# Patient Record
Sex: FEMALE | Race: OTHER | Employment: PART TIME | URBAN - METROPOLITAN AREA
[De-identification: names, ages, dates, MRNs, and addresses within clinical notes are randomized per-mention and may not be internally consistent; named-entity substitution may affect disease eponyms.]

---

## 2021-09-27 NOTE — LETTER
[facility]  Administration of Anesthesia  IMechelle agree to be cared for by a physician anesthesiologist alone and/or with a nurse anesthetist, who is specially trained to monitor me and give me medicine to put me to sleep or keep me comfortable during my procedure    I understand that my anesthesiologist and/or anesthetist is not an employee or agent of [facility] or [employer]. He or she works for [facility]    As the patient asking for anesthesia services, I agree to:  Allow the anesthesiologist (anesthesia doctor) to give me medicine and do additional procedures as necessary. Some examples are: Starting or using an “IV” to give me medicine, fluids or blood during my procedure, and having a breathing tube placed to help me breathe when I’m asleep (intubation). In the event that my heart stops working properly, I understand that my anesthesiologist will make every effort to sustain my life, unless otherwise directed by [facility] Do Not Resuscitate documents.  Tell my anesthesia doctor before my procedure:  If I am pregnant.  The last time that I ate or drank.  iii. All of the medicines I take (including prescriptions, herbal supplements, and pills I can buy without a prescription (including street drugs/illegal medications). Failure to inform my anesthesiologist about these medicines may increase my risk of anesthetic complications.  iv.If I am allergic to anything or have had a reaction to anesthesia before.  I understand how the anesthesia medicine will help me (benefits).  I understand that with any type of anesthesia medicine there are risks:  The most common risks are: nausea, vomiting, sore throat, muscle soreness, damage to my eyes, mouth, or teeth (from breathing tube placement).  Rare risks include: remembering what happened during my procedure, allergic reactions to medications, injury to my airway, heart, lungs, vision, nerves, or  muscles and in extremely rare instances death.  My doctor has explained to me other choices available to me for my care (alternatives).  Pregnant Patients (“epidural”):  I understand that the risks of having an epidural (medicine given into my back to help control pain during labor), include itching, low blood pressure, difficulty urinating, headache or slowing of the baby’s heart. Very rare risks include infection, bleeding, seizure, irregular heart rhythms and nerve injury.  Regional Anesthesia (“spinal”, “epidural”, & “nerve blocks”):  I understand that rare but potential complications include headache, bleeding, infection, seizure, irregular heart rhythms, and nerve injury.    _____________________________________________________________________________  Patient (or Representative) Signature/Relationship to Patient  Date   Time    _____________________________________________________________________________   Name (if used)    Language/Organization   Time    _____________________________________________________________________________  Nurse Anesthetist Signature     Date   Time  _____________________________________________________________________________  Anesthesiologist Signature     Date   Time  I have discussed the procedure and information above with the patient (or patient’s representative) and answered their questions. The patient or their representative has agreed to have anesthesia services.    _____________________________________________________________________________  Witness        Date   Time  I have verified that the signature is that of the patient or patient’s representative, and that it was signed before the procedure  Patient Name: Mechelle Wise     : 1990                 Printed: 3/23/2025 at 9:02 AM    Medical Record #: [id number]                                            Page 1 of 1  ----------ANESTHESIA CONSENT----------     [[id number]]

## 2022-08-15 ENCOUNTER — HOSPITAL ENCOUNTER (OUTPATIENT)
Age: 32
Discharge: HOME | End: 2022-08-15
Payer: COMMERCIAL

## 2022-08-15 VITALS
DIASTOLIC BLOOD PRESSURE: 78 MMHG | OXYGEN SATURATION: 99 % | RESPIRATION RATE: 18 BRPM | SYSTOLIC BLOOD PRESSURE: 107 MMHG | HEART RATE: 68 BPM | TEMPERATURE: 99 F

## 2022-08-15 DIAGNOSIS — K04.7 DENTAL INFECTION: Primary | ICD-10-CM

## 2022-08-15 PROCEDURE — 99203 OFFICE O/P NEW LOW 30 MIN: CPT | Performed by: NURSE PRACTITIONER

## 2022-08-15 RX ORDER — AMOXICILLIN AND CLAVULANATE POTASSIUM 875; 125 MG/1; MG/1
1 TABLET, FILM COATED ORAL 2 TIMES DAILY
Qty: 20 TABLET | Refills: 0 | Status: SHIPPED | OUTPATIENT
Start: 2022-08-15 | End: 2022-08-25

## 2022-08-15 RX ORDER — ACETAMINOPHEN AND CODEINE PHOSPHATE 300; 30 MG/1; MG/1
1-2 TABLET ORAL EVERY 6 HOURS PRN
Qty: 10 TABLET | Refills: 0 | Status: SHIPPED | OUTPATIENT
Start: 2022-08-15 | End: 2022-08-20

## 2022-08-15 RX ORDER — IBUPROFEN 600 MG/1
600 TABLET ORAL EVERY 8 HOURS PRN
Qty: 30 TABLET | Refills: 0 | Status: SHIPPED | OUTPATIENT
Start: 2022-08-15 | End: 2022-08-22

## 2022-08-15 NOTE — TELEPHONE ENCOUNTER
With Language Line  Guillermina Chiu  ID# [id number]        Patient  and her Father in law  calling ( identified name and  )states patient has never been seen in the clinic     States has facial swelling due to a tooth issue   Informed needs to call a dentist or go to [facility] 7 feels JAMARCUS not understanding, explained again needs to see a dentist or go to UC     Provided locations/ hours for UC in [place]    Informed mask is required for building entry      explained the information for a 3rd time   Father in law said he understands and thank you

## 2022-08-22 ENCOUNTER — HOSPITAL ENCOUNTER (EMERGENCY)
Facility: HOSPITAL | Age: 32
Discharge: LEFT BEFORE DISCHARGE | End: 2022-08-22
Payer: COMMERCIAL

## 2022-08-22 ENCOUNTER — HOSPITAL ENCOUNTER (OUTPATIENT)
Age: 32
Discharge: ANOTHER HOSPITAL | End: 2022-08-22
Payer: COMMERCIAL

## 2022-08-22 VITALS
RESPIRATION RATE: 18 BRPM | HEIGHT: 60 IN | BODY MASS INDEX: 27.48 KG/M2 | WEIGHT: 140 LBS | TEMPERATURE: 97 F | SYSTOLIC BLOOD PRESSURE: 113 MMHG | HEART RATE: 68 BPM | OXYGEN SATURATION: 100 % | DIASTOLIC BLOOD PRESSURE: 69 MMHG

## 2022-08-22 VITALS
OXYGEN SATURATION: 98 % | DIASTOLIC BLOOD PRESSURE: 66 MMHG | WEIGHT: 140 LBS | RESPIRATION RATE: 22 BRPM | TEMPERATURE: 97 F | BODY MASS INDEX: 27 KG/M2 | HEART RATE: 68 BPM | SYSTOLIC BLOOD PRESSURE: 104 MMHG

## 2022-08-22 DIAGNOSIS — R10.9 ABDOMINAL PAIN, ACUTE: Primary | ICD-10-CM

## 2022-08-22 DIAGNOSIS — R19.7 DIARRHEA, UNSPECIFIED TYPE: ICD-10-CM

## 2022-08-22 PROCEDURE — 99204 OFFICE O/P NEW MOD 45 MIN: CPT | Performed by: PHYSICIAN ASSISTANT

## 2022-08-23 NOTE — ED INITIAL ASSESSMENT (HPI)
PT TO ER WITH ABDOMINAL PAIN X4 DAYS AND STATES YESTERDAY SHE NOTICED BLOOD IN HER STOOL, PT STATES THE BLOOD WAS BRIGHT RED, PT DID NOT NOTICE ANY BLOOD IN HER STOOL TODAY. PT DENIES NAUSEA/VOMITING. PT STATES THE PAIN IS GENERALIZED AND IS NOT WORSE IN ANY SPECIFIC PLACE. PT STATES SHE LAST ATE THIS AM AND STATES AFTER SHE ATE THE PAIN GOT WORSE. PT WAS AT IMMEDIATE CARE PTA AND WAS SENT FOR FURTHER EVALUATION. PT STATES SHE WAS PUT ON AMOXICILLIN AND TYLENOL WITH CODEINE FOR A TOOTH INFECTION 4 DAYS AGO.

## 2023-02-22 ENCOUNTER — HOSPITAL ENCOUNTER (OUTPATIENT)
Age: 33
Discharge: ANOTHER HOSPITAL | End: 2023-02-22
Payer: COMMERCIAL

## 2023-02-22 VITALS
DIASTOLIC BLOOD PRESSURE: 71 MMHG | HEIGHT: 59 IN | OXYGEN SATURATION: 100 % | RESPIRATION RATE: 18 BRPM | SYSTOLIC BLOOD PRESSURE: 112 MMHG | BODY MASS INDEX: 28.22 KG/M2 | WEIGHT: 140 LBS | TEMPERATURE: 98 F | HEART RATE: 108 BPM

## 2023-02-22 DIAGNOSIS — R10.9 ABDOMINAL PAIN OF UNKNOWN ETIOLOGY: ICD-10-CM

## 2023-02-22 DIAGNOSIS — M54.50 ACUTE RIGHT-SIDED LOW BACK PAIN WITHOUT SCIATICA: Primary | ICD-10-CM

## 2023-02-22 LAB
B-HCG UR QL: NEGATIVE
BILIRUB UR QL STRIP: NEGATIVE
CLARITY UR: CLEAR
COLOR UR: YELLOW
GLUCOSE UR STRIP-MCNC: NEGATIVE MG/DL
KETONES UR STRIP-MCNC: NEGATIVE MG/DL
NITRITE UR QL STRIP: NEGATIVE
PH UR STRIP: 6.5 [PH]
PROT UR STRIP-MCNC: NEGATIVE MG/DL
SP GR UR STRIP: 1.01
UROBILINOGEN UR STRIP-ACNC: <2 MG/DL

## 2023-02-22 PROCEDURE — 81002 URINALYSIS NONAUTO W/O SCOPE: CPT | Performed by: NURSE PRACTITIONER

## 2023-02-22 PROCEDURE — 99214 OFFICE O/P EST MOD 30 MIN: CPT | Performed by: NURSE PRACTITIONER

## 2023-02-22 PROCEDURE — 81025 URINE PREGNANCY TEST: CPT | Performed by: NURSE PRACTITIONER

## 2023-02-22 NOTE — ED INITIAL ASSESSMENT (HPI)
Pt reports back pain beginning yesterday that extends to abdomen. No injury or trauma.  No urinary symptoms or n/v/d.

## 2023-03-07 ENCOUNTER — OFFICE VISIT (OUTPATIENT)
Dept: FAMILY MEDICINE CLINIC | Facility: CLINIC | Age: 33
End: 2023-03-07

## 2023-03-07 VITALS
SYSTOLIC BLOOD PRESSURE: 99 MMHG | WEIGHT: 143 LBS | BODY MASS INDEX: 28.83 KG/M2 | HEART RATE: 82 BPM | HEIGHT: 59 IN | DIASTOLIC BLOOD PRESSURE: 66 MMHG

## 2023-03-07 DIAGNOSIS — M54.6 ACUTE RIGHT-SIDED THORACIC BACK PAIN: Primary | ICD-10-CM

## 2023-03-07 DIAGNOSIS — Z87.442 HISTORY OF RENAL STONE: ICD-10-CM

## 2023-03-07 DIAGNOSIS — N12 PYELONEPHRITIS: ICD-10-CM

## 2023-03-07 LAB
APPEARANCE: CLEAR
BILIRUBIN: NEGATIVE
GLUCOSE (URINE DIPSTICK): NEGATIVE MG/DL
KETONES (URINE DIPSTICK): NEGATIVE MG/DL
LEUKOCYTES: NEGATIVE
MULTISTIX LOT#: NORMAL NUMERIC
NITRITE, URINE: NEGATIVE
OCCULT BLOOD: NEGATIVE
PH, URINE: 7.5 (ref 4.5–8)
PROTEIN (URINE DIPSTICK): NEGATIVE MG/DL
SPECIFIC GRAVITY: 1.01 (ref 1–1.03)
UROBILINOGEN,SEMI-QN: 0.2 MG/DL (ref 0–1.9)

## 2023-03-07 PROCEDURE — 3078F DIAST BP <80 MM HG: CPT | Performed by: FAMILY MEDICINE

## 2023-03-07 PROCEDURE — 81003 URINALYSIS AUTO W/O SCOPE: CPT | Performed by: FAMILY MEDICINE

## 2023-03-07 PROCEDURE — 99203 OFFICE O/P NEW LOW 30 MIN: CPT | Performed by: FAMILY MEDICINE

## 2023-03-07 PROCEDURE — 3008F BODY MASS INDEX DOCD: CPT | Performed by: FAMILY MEDICINE

## 2023-03-07 PROCEDURE — 3074F SYST BP LT 130 MM HG: CPT | Performed by: FAMILY MEDICINE

## 2023-03-07 RX ORDER — IBUPROFEN 600 MG/1
600 TABLET ORAL EVERY 6 HOURS PRN
Qty: 60 TABLET | Refills: 0 | Status: SHIPPED | OUTPATIENT
Start: 2023-03-07

## 2023-04-06 ENCOUNTER — HOSPITAL ENCOUNTER (OUTPATIENT)
Dept: ULTRASOUND IMAGING | Age: 33
Discharge: HOME | End: 2023-04-06
Attending: FAMILY MEDICINE
Payer: COMMERCIAL

## 2023-04-06 DIAGNOSIS — N12 PYELONEPHRITIS: ICD-10-CM

## 2023-04-06 DIAGNOSIS — Z87.442 HISTORY OF RENAL STONE: ICD-10-CM

## 2023-04-06 PROCEDURE — 76770 US EXAM ABDO BACK WALL COMP: CPT | Performed by: FAMILY MEDICINE

## 2023-04-11 ENCOUNTER — OFFICE VISIT (OUTPATIENT)
Dept: FAMILY MEDICINE CLINIC | Facility: CLINIC | Age: 33
End: 2023-04-11

## 2023-04-11 VITALS
DIASTOLIC BLOOD PRESSURE: 68 MMHG | WEIGHT: 143.38 LBS | HEIGHT: 59 IN | SYSTOLIC BLOOD PRESSURE: 105 MMHG | BODY MASS INDEX: 28.91 KG/M2 | HEART RATE: 77 BPM

## 2023-04-11 DIAGNOSIS — M54.6 ACUTE RIGHT-SIDED THORACIC BACK PAIN: Primary | ICD-10-CM

## 2023-04-11 DIAGNOSIS — N12 PYELONEPHRITIS: ICD-10-CM

## 2023-04-11 PROCEDURE — 3074F SYST BP LT 130 MM HG: CPT | Performed by: FAMILY MEDICINE

## 2023-04-11 PROCEDURE — 3078F DIAST BP <80 MM HG: CPT | Performed by: FAMILY MEDICINE

## 2023-04-11 PROCEDURE — 3008F BODY MASS INDEX DOCD: CPT | Performed by: FAMILY MEDICINE

## 2023-04-11 PROCEDURE — 99213 OFFICE O/P EST LOW 20 MIN: CPT | Performed by: FAMILY MEDICINE

## 2023-08-08 ENCOUNTER — OFFICE VISIT (OUTPATIENT)
Dept: FAMILY MEDICINE CLINIC | Facility: CLINIC | Age: 33
End: 2023-08-08

## 2023-08-08 VITALS
BODY MASS INDEX: 28.83 KG/M2 | SYSTOLIC BLOOD PRESSURE: 102 MMHG | RESPIRATION RATE: 16 BRPM | TEMPERATURE: 97 F | DIASTOLIC BLOOD PRESSURE: 65 MMHG | WEIGHT: 143 LBS | HEART RATE: 72 BPM | HEIGHT: 59 IN

## 2023-08-08 DIAGNOSIS — Z12.4 SCREENING FOR CERVICAL CANCER: ICD-10-CM

## 2023-08-08 DIAGNOSIS — B35.3 TINEA PEDIS OF BOTH FEET: ICD-10-CM

## 2023-08-08 DIAGNOSIS — N89.8 VAGINAL ITCHING: ICD-10-CM

## 2023-08-08 DIAGNOSIS — Z00.00 WELL ADULT EXAM: Primary | ICD-10-CM

## 2023-08-08 PROCEDURE — 3008F BODY MASS INDEX DOCD: CPT | Performed by: NURSE PRACTITIONER

## 2023-08-08 PROCEDURE — 3074F SYST BP LT 130 MM HG: CPT | Performed by: NURSE PRACTITIONER

## 2023-08-08 PROCEDURE — 3078F DIAST BP <80 MM HG: CPT | Performed by: NURSE PRACTITIONER

## 2023-08-08 PROCEDURE — 99395 PREV VISIT EST AGE 18-39: CPT | Performed by: NURSE PRACTITIONER

## 2023-08-08 RX ORDER — CLOTRIMAZOLE AND BETAMETHASONE DIPROPIONATE 10; .64 MG/G; MG/G
1 CREAM TOPICAL 2 TIMES DAILY
Qty: 60 G | Refills: 1 | Status: SHIPPED | OUTPATIENT
Start: 2023-08-08

## 2023-08-11 LAB — TRICHOMONAS SCREEN: NEGATIVE

## 2023-08-12 ENCOUNTER — LAB ENCOUNTER (OUTPATIENT)
Dept: LAB | Age: 33
End: 2023-08-12
Attending: NURSE PRACTITIONER
Payer: COMMERCIAL

## 2023-08-12 DIAGNOSIS — Z00.00 WELL ADULT EXAM: ICD-10-CM

## 2023-08-12 LAB
ALBUMIN SERPL-MCNC: 3.8 G/DL (ref 3.4–5)
ALBUMIN/GLOB SERPL: 1 {RATIO} (ref 1–2)
ALP LIVER SERPL-CCNC: 86 U/L
ALT SERPL-CCNC: 52 U/L
ANION GAP SERPL CALC-SCNC: 3 MMOL/L (ref 0–18)
AST SERPL-CCNC: 42 U/L (ref 15–37)
BASOPHILS # BLD AUTO: 0.12 X10(3) UL (ref 0–0.2)
BASOPHILS NFR BLD AUTO: 1.4 %
BILIRUB SERPL-MCNC: 1.1 MG/DL (ref 0.1–2)
BUN BLD-MCNC: 8 MG/DL (ref 7–18)
CALCIUM BLD-MCNC: 9 MG/DL (ref 8.5–10.1)
CHLORIDE SERPL-SCNC: 109 MMOL/L (ref 98–112)
CHOLEST SERPL-MCNC: 169 MG/DL (ref ?–200)
CO2 SERPL-SCNC: 26 MMOL/L (ref 21–32)
CREAT BLD-MCNC: 0.74 MG/DL
EGFRCR SERPLBLD CKD-EPI 2021: 109 ML/MIN/1.73M2 (ref 60–?)
EOSINOPHIL # BLD AUTO: 0.31 X10(3) UL (ref 0–0.7)
EOSINOPHIL NFR BLD AUTO: 3.6 %
ERYTHROCYTE [DISTWIDTH] IN BLOOD BY AUTOMATED COUNT: 12.8 %
FASTING PATIENT LIPID ANSWER: YES
FASTING STATUS PATIENT QL REPORTED: YES
GLOBULIN PLAS-MCNC: 3.8 G/DL (ref 2.8–4.4)
GLUCOSE BLD-MCNC: 91 MG/DL (ref 70–99)
HCT VFR BLD AUTO: 41 %
HDLC SERPL-MCNC: 38 MG/DL (ref 40–59)
HGB BLD-MCNC: 13.7 G/DL
IMM GRANULOCYTES # BLD AUTO: 0.04 X10(3) UL (ref 0–1)
IMM GRANULOCYTES NFR BLD: 0.5 %
LDLC SERPL CALC-MCNC: 91 MG/DL (ref ?–100)
LYMPHOCYTES # BLD AUTO: 2.17 X10(3) UL (ref 1–4)
LYMPHOCYTES NFR BLD AUTO: 25.1 %
MCH RBC QN AUTO: 30.3 PG (ref 26–34)
MCHC RBC AUTO-ENTMCNC: 33.4 G/DL (ref 31–37)
MCV RBC AUTO: 90.7 FL
MONOCYTES # BLD AUTO: 0.6 X10(3) UL (ref 0.1–1)
MONOCYTES NFR BLD AUTO: 6.9 %
NEUTROPHILS # BLD AUTO: 5.42 X10 (3) UL (ref 1.5–7.7)
NEUTROPHILS # BLD AUTO: 5.42 X10(3) UL (ref 1.5–7.7)
NEUTROPHILS NFR BLD AUTO: 62.5 %
NONHDLC SERPL-MCNC: 131 MG/DL (ref ?–130)
OSMOLALITY SERPL CALC.SUM OF ELEC: 284 MOSM/KG (ref 275–295)
PLATELET # BLD AUTO: 327 10(3)UL (ref 150–450)
POTASSIUM SERPL-SCNC: 4.3 MMOL/L (ref 3.5–5.1)
PROT SERPL-MCNC: 7.6 G/DL (ref 6.4–8.2)
RBC # BLD AUTO: 4.52 X10(6)UL
SODIUM SERPL-SCNC: 138 MMOL/L (ref 136–145)
TRIGL SERPL-MCNC: 236 MG/DL (ref 30–149)
TSI SER-ACNC: 0.93 MIU/ML (ref 0.36–3.74)
VLDLC SERPL CALC-MCNC: 39 MG/DL (ref 0–30)
WBC # BLD AUTO: 8.7 X10(3) UL (ref 4–11)

## 2023-08-12 PROCEDURE — 36415 COLL VENOUS BLD VENIPUNCTURE: CPT

## 2023-08-12 PROCEDURE — 85025 COMPLETE CBC W/AUTO DIFF WBC: CPT

## 2023-08-12 PROCEDURE — 80053 COMPREHEN METABOLIC PANEL: CPT

## 2023-08-12 PROCEDURE — 80061 LIPID PANEL: CPT

## 2023-08-12 PROCEDURE — 84443 ASSAY THYROID STIM HORMONE: CPT

## 2023-08-14 ENCOUNTER — TELEPHONE (OUTPATIENT)
Dept: FAMILY MEDICINE CLINIC | Facility: CLINIC | Age: 33
End: 2023-08-14

## 2023-08-14 DIAGNOSIS — Z23 ENCOUNTER FOR ADMINISTRATION OF VACCINE: Primary | ICD-10-CM

## 2023-08-14 NOTE — TELEPHONE ENCOUNTER
Arturo Lenz: can you place order for Tdap. Patient states she didn't get it at the office 8/8/23. She is scheduled for RN visit 9/9/23 for the vaccine.

## 2023-09-09 ENCOUNTER — NURSE ONLY (OUTPATIENT)
Dept: FAMILY MEDICINE CLINIC | Facility: CLINIC | Age: 33
End: 2023-09-09

## 2023-09-09 DIAGNOSIS — Z23 NEED FOR VACCINATION: Primary | ICD-10-CM

## 2023-09-09 PROCEDURE — 90715 TDAP VACCINE 7 YRS/> IM: CPT | Performed by: NURSE PRACTITIONER

## 2023-09-09 PROCEDURE — 90471 IMMUNIZATION ADMIN: CPT | Performed by: NURSE PRACTITIONER

## 2023-11-06 ENCOUNTER — HOSPITAL ENCOUNTER (OUTPATIENT)
Age: 33
Discharge: HOME | End: 2023-11-06
Payer: COMMERCIAL

## 2023-11-06 ENCOUNTER — APPOINTMENT (OUTPATIENT)
Dept: GENERAL RADIOLOGY | Age: 33
End: 2023-11-06
Attending: NURSE PRACTITIONER
Payer: COMMERCIAL

## 2023-11-06 VITALS
HEART RATE: 76 BPM | SYSTOLIC BLOOD PRESSURE: 105 MMHG | TEMPERATURE: 97 F | RESPIRATION RATE: 16 BRPM | DIASTOLIC BLOOD PRESSURE: 63 MMHG | OXYGEN SATURATION: 100 %

## 2023-11-06 DIAGNOSIS — S62.652A CLOSED NONDISPLACED FRACTURE OF MIDDLE PHALANX OF RIGHT MIDDLE FINGER, INITIAL ENCOUNTER: Primary | ICD-10-CM

## 2023-11-06 PROCEDURE — 99213 OFFICE O/P EST LOW 20 MIN: CPT | Performed by: NURSE PRACTITIONER

## 2023-11-06 PROCEDURE — 73130 X-RAY EXAM OF HAND: CPT | Performed by: NURSE PRACTITIONER

## 2023-11-06 PROCEDURE — A4570 SPLINT: HCPCS | Performed by: NURSE PRACTITIONER

## 2023-11-06 NOTE — ED INITIAL ASSESSMENT (HPI)
Pt c/o R 3rd digit pain since October 11th. States works at [employer] and injured finger in a [employer]/ [address].  + pain + swelling CMS intact

## 2023-11-07 NOTE — DISCHARGE INSTRUCTIONS
Wear the finger splint until otherwise directed by the specialist.  Rest from exacerbating activities for the next week. Apply ice/cold compress for 20min at a time 4-6x daily for the next 2-3 days. Keep the right hand elevated when resting as much as possible. You may take Motrin every 6 hours as needed for pain. Take this with food. If additional pain control is needed, you may also take Tylenol every 6 hours. Follow up with occupational health or the hand specialist provided in your discharge instructions in the next 2-3 days. Seek additional care in the ER for new or worsening symptoms, fever or increasing pain.

## 2023-11-08 ENCOUNTER — TELEPHONE (OUTPATIENT)
Dept: FAMILY MEDICINE CLINIC | Facility: CLINIC | Age: 33
End: 2023-11-08

## 2023-11-08 DIAGNOSIS — S69.91XA INJURY OF FINGER OF RIGHT HAND, INITIAL ENCOUNTER: Primary | ICD-10-CM

## 2023-11-09 ENCOUNTER — APPOINTMENT (OUTPATIENT)
Dept: SURGERY | Facility: CLINIC | Age: 33
End: 2023-11-09

## 2023-11-09 ENCOUNTER — OFFICE VISIT (OUTPATIENT)
Dept: SURGERY | Facility: CLINIC | Age: 33
End: 2023-11-09

## 2023-11-09 DIAGNOSIS — M79.601 PAIN OF RIGHT UPPER EXTREMITY: Primary | ICD-10-CM

## 2023-11-09 DIAGNOSIS — M79.641 PAIN IN RIGHT HAND: ICD-10-CM

## 2023-11-09 DIAGNOSIS — T14.8XXA CONTUSION OF BONE: Primary | ICD-10-CM

## 2023-11-09 PROCEDURE — 99204 OFFICE O/P NEW MOD 45 MIN: CPT | Performed by: PLASTIC SURGERY

## 2023-11-09 PROCEDURE — 29130 APPL FINGER SPLINT STATIC: CPT | Performed by: OCCUPATIONAL THERAPIST

## 2023-11-09 NOTE — TELEPHONE ENCOUNTER
Provider Address Phone   Haley Babin MD [address] [id number] [phone]     Called spouse, Juan J Moore using language lines Rosi Nice ID# [id number]  Left message for spouse with referral

## 2023-11-09 NOTE — H&P
Injury 1: R hand injury,possible RMF fracture  - Date: 10/11/23  - Days Since: 34     Mechelle Lomas is a 35year old female that presents with   Chief Complaint   Patient presents with    Injury     R hand    . REFERRED BY:  Dheeraj Howard    Pacemaker: No  Latex Allergy: no  Coumadin: No  Plavix: No  Other anticoagulants: No  Cardiac stents: No    HAND DOMINANCE:  Right    Profession:     RECONSTRUCTIVE HISTORY    SUN EXPOSURE   Current no   Past no   Sunburns no   Tanning salons current no   Tanning salons past no     SKIN CANCER    Personal history of skin cancer: none      HPI:       Injury 1: RH injury, dorsal contusion by machine work, seen 29 days postinjury  - Date: 10/11/23  - Days Since: 34    Francis, language line, translating    80-year-old female right-hand-dominant with right hand pain    Dorsal right hand contusion when a machine struck the dorsum of her fingers    She did not seek medical attention initially. 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 Treatment Inially did not seek medical attention. Then RH began to \"swell\" Seen in immediate care on 11/6/23, x-ray performed, splinted       Pain  yes intermittent RMF DIP into hand and  elbow. Throbbing. Rates pain 6/10. Not taking analgesics. Numbness Yes RMF into palm. Arrived with RMF splinted with coban CDI,removed.   Large amount of edema RMF  Pt Malay speaking used   [employer] #[id number]      PMH:     MEDICAL  Past Medical History:   Diagnosis Date    Renal stones         SURGICAL  History reviewed. No pertinent surgical history. ALLERIGIES  No Known Allergies     MEDICATIONS  Current Outpatient Medications   Medication Sig Dispense Refill    clotrimazole-betamethasone 1-0.05 % External Cream Apply 1 Application topically in the morning and 1 Application before bedtime. (Patient not taking: Reported on 11/9/2023) 60 g 1        SOCIAL HISTORY  Social History     Socioeconomic History    Marital status: Single   Tobacco Use    Smoking status: Never     Passive exposure: Never    Smokeless tobacco: Never   Vaping Use    Vaping Use: Never used   Substance and Sexual Activity    Alcohol use: Never    Drug use: Never        FAMILY HISTORY  History reviewed. No pertinent family history. PHYSICAL EXAM:     CONSTITUTIONAL: Overall appearance - Normal  HEENT: Normocephalic  EYES: Conjunctiva - Right: Normal, Left: Normal; EOMI  EARS: Inspection - Right: Normal, Left: Normal  NECK/THYROID: Inspection - Normal, Palpation - Normal, Thyroid gland - Normal, No adenopathy  RESPIRATORY: Inspection - Normal, Effort - Normal  CARDIOVASCULAR: Regular rhythm, No murmurs  ABDOMEN: Inspection - Normal, No abdominal tenderness  NEURO: Memory intact  PSYCH: Oriented to person, place, time, and situation, Appropriate mood and affect      Hand Physical Exam:     RMF dorsal PIP and proximal phalanx tenderness  No lag  FDS, FDP, central slip, terminal slip intact  PIP/DIP   RCL/UCL intact    No hand tenderness    X-ray independently interpreted: No fracture or dislocation    ASSESSMENT/PLAN:       BONE CONTUSION RMF     We had a long discussion. I explained to the patient what a bone contusion is and the fact that these can be extremely painful for very long time.   Even with therapy, pain may persist.      PLAN:    Finger extension splint for 2 weeks  2 weeks start OT for range of motion and strengthening  3 weeks return to regular work    11/9/2023  Lance Boss MD      +++++++++++++++++++++++++++++++++++++++++      MEDICAL DECISION MAKING    PROBLEMS      MODERATE    (number / complexity)          Acute complicated injury    DATA         MODERATE    (amount / complexity)          X-rays independently reviewed    MANAGEMENT RISK  LOW    (complications/ morbidity)       Splint/OT                  MDM LEVEL    MODERATE

## 2023-11-09 NOTE — TELEPHONE ENCOUNTER
Phone room transferred call to me, Per Phone room Patient is not understanding why wife needs appointment for referral. Spoke to   of patient was obtained, advised on message below, Needs appointment for referral to be generated,  states wife is already inside at appointment visit. I advised I will relate message with Doctor Maxi Zhao but will not guarantee he will sign off on referral, Patient verbalized understanding and had no further questions. Please advise on this message.

## 2023-11-13 NOTE — PROGRESS NOTES
Subjective: I hurt my RMF. Objective:     Current level of performance:  ADL: Independent. Work: 2 # lifting restriction with the right hand. Leisure: Not addressed    Measurements/Tests:  ROM:         N/A         Treatment Provided this day: Fabricated a RMF extension splint per order. Treatment Time: 20 minutes      Summary/Analysis of Treatment session: Tolerated the fabrication of a RMF extension splint well. Plan: To be compliant with the wear and care of RMF extension splint x 4 weeks: Follow up in:  x 2 weeks.           Huey Quinonez  OTR/L

## 2023-11-27 ENCOUNTER — APPOINTMENT (OUTPATIENT)
Dept: SURGERY | Facility: CLINIC | Age: 33
End: 2023-11-27

## 2023-11-27 DIAGNOSIS — M62.81 DISTAL MUSCLE WEAKNESS: Primary | ICD-10-CM

## 2023-11-27 DIAGNOSIS — M25.641 JOINT STIFFNESS OF HAND, RIGHT: ICD-10-CM

## 2023-11-27 PROCEDURE — 97110 THERAPEUTIC EXERCISES: CPT | Performed by: OCCUPATIONAL THERAPIST

## 2023-11-30 ENCOUNTER — OFFICE VISIT (OUTPATIENT)
Dept: SURGERY | Facility: CLINIC | Age: 33
End: 2023-11-30

## 2023-11-30 ENCOUNTER — APPOINTMENT (OUTPATIENT)
Dept: SURGERY | Facility: CLINIC | Age: 33
End: 2023-11-30

## 2023-11-30 DIAGNOSIS — M79.641 PAIN IN RIGHT HAND: ICD-10-CM

## 2023-11-30 DIAGNOSIS — T14.8XXA CONTUSION OF BONE: Primary | ICD-10-CM

## 2023-11-30 DIAGNOSIS — M62.81 DISTAL MUSCLE WEAKNESS: Primary | ICD-10-CM

## 2023-11-30 DIAGNOSIS — M25.641 JOINT STIFFNESS OF HAND, RIGHT: ICD-10-CM

## 2023-11-30 PROCEDURE — 97110 THERAPEUTIC EXERCISES: CPT | Performed by: OCCUPATIONAL THERAPIST

## 2023-11-30 PROCEDURE — 99213 OFFICE O/P EST LOW 20 MIN: CPT | Performed by: PLASTIC SURGERY

## 2023-11-30 RX ORDER — METHYLPREDNISOLONE 4 MG/1
TABLET ORAL
Qty: 1 EACH | Refills: 0 | Status: SHIPPED | OUTPATIENT
Start: 2023-11-30

## 2023-11-30 NOTE — PROGRESS NOTES
Injury 1: RH injury, dorsal contusion by machine work, seen 29 days postinjury  - Date: 10/11/23  - Days Since: 50    50 days postinjury, 21 days of treatment    She has regained full range of motion but still has pain at the PIP, which is improved    Although she was placed on a 1 hand work restriction, she indicated that they were making her work with her right hand. She had continued pain swelling and stiffness, and this noncompliance with instructions by the employer has prolonged her recovery.       Full range of motion  Mild volar and dorsal tenderness    Strength 3 versus 50    She has excellent range of motion but some persistent pain which is much improved    OT including ultrasound  Medrol Dosepak  2 weeks return to work: 12/18/2023      +++++++++++++++++++++++++++++++++++++++++      MEDICAL DECISION MAKING    PROBLEMS      MODERATE    (number / complexity)          Acute complicated injury    DATA         STRAIGHTFORWARD    (amount / complexity)              MANAGEMENT RISK  LOW    (complications/ morbidity)       Splint/OT                  MDM LEVEL    LOW

## 2023-12-04 ENCOUNTER — APPOINTMENT (OUTPATIENT)
Dept: SURGERY | Facility: CLINIC | Age: 33
End: 2023-12-04

## 2023-12-04 DIAGNOSIS — M62.81 DISTAL MUSCLE WEAKNESS: Primary | ICD-10-CM

## 2023-12-04 DIAGNOSIS — M25.641 JOINT STIFFNESS OF HAND, RIGHT: ICD-10-CM

## 2023-12-04 PROCEDURE — 97035 APP MDLTY 1+ULTRASOUND EA 15: CPT | Performed by: OCCUPATIONAL THERAPIST

## 2023-12-04 NOTE — PROGRESS NOTES
Subjective: My RMF still hurts. Objective:     Current level of performance:  ADL: Independent  Work: On leave  Leisure: Family    Measurements/Tests:  ROM:  Testing By: psm  MP Middle Right: 90  PIP Middle Right: 80  DIP Middle Right: 40  Edema Middle Right: 210 degrees of SOARES.            Treatment Provided this day: Up dated RMF objective information: Physician follow-up. Treatment Time: 20 minutes      Summary/Analysis of Treatment session: Progressing well with OT goals and objectives. Plan: To return to full unrestricted work activity by 12/18/2023: Follow up in:  x 1 week.           Giovana Andersen  OTR/MALVIN

## 2023-12-14 ENCOUNTER — APPOINTMENT (OUTPATIENT)
Dept: SURGERY | Facility: CLINIC | Age: 33
End: 2023-12-14

## 2023-12-14 DIAGNOSIS — M25.641 JOINT STIFFNESS OF HAND, RIGHT: ICD-10-CM

## 2023-12-14 DIAGNOSIS — M62.81 DISTAL MUSCLE WEAKNESS: Primary | ICD-10-CM

## 2023-12-14 PROCEDURE — 97035 APP MDLTY 1+ULTRASOUND EA 15: CPT | Performed by: OCCUPATIONAL THERAPIST

## 2023-12-18 NOTE — PROGRESS NOTES
Subjective: I need a little more time with work restrictions. Objective:     Current level of performance:  ADL: Independent  Work: Returning to x 10 # lifting restriction with the right hand. Leisure: Family. Measurements/Tests:  ROM:  Testing By: angelo  MP Middle Right: 90  PIP Middle Right: 90  DIP Middle Right: 50  Edema Middle Right: 230 degrees of SOARES            Treatment Provided this day: Ultrasound to the RMF: 8 min. Continuous 3.3 mhz w/cm squared. To reduce edema, increase circulation, soften scar tissue, for increased range of motion and reduction of pain. Re-reviewed HEP. Up dated RMF objective information:    Treatment Time: 20 minutes      Summary/Analysis of Treatment session: No further OT needs at this time. Plan: Discontinue OT. Follow up in: To call with questions and or concerns. Jing Marcum  OTR/L      I have reviewed the treatment plan and concur.    Nirmala Blair MD

## 2024-02-13 ENCOUNTER — OFFICE VISIT (OUTPATIENT)
Dept: INTERNAL MEDICINE CLINIC | Facility: CLINIC | Age: 34
End: 2024-02-13

## 2024-02-13 VITALS
BODY MASS INDEX: 30.04 KG/M2 | OXYGEN SATURATION: 99 % | HEIGHT: 59 IN | WEIGHT: 149 LBS | DIASTOLIC BLOOD PRESSURE: 64 MMHG | HEART RATE: 77 BPM | SYSTOLIC BLOOD PRESSURE: 104 MMHG | TEMPERATURE: 99 F

## 2024-02-13 DIAGNOSIS — B96.89 ACUTE BACTERIAL SINUSITIS: Primary | ICD-10-CM

## 2024-02-13 DIAGNOSIS — J01.90 ACUTE BACTERIAL SINUSITIS: Primary | ICD-10-CM

## 2024-02-13 PROCEDURE — 3008F BODY MASS INDEX DOCD: CPT | Performed by: STUDENT IN AN ORGANIZED HEALTH CARE EDUCATION/TRAINING PROGRAM

## 2024-02-13 PROCEDURE — 3074F SYST BP LT 130 MM HG: CPT | Performed by: STUDENT IN AN ORGANIZED HEALTH CARE EDUCATION/TRAINING PROGRAM

## 2024-02-13 PROCEDURE — 3078F DIAST BP <80 MM HG: CPT | Performed by: STUDENT IN AN ORGANIZED HEALTH CARE EDUCATION/TRAINING PROGRAM

## 2024-02-13 PROCEDURE — 99213 OFFICE O/P EST LOW 20 MIN: CPT | Performed by: STUDENT IN AN ORGANIZED HEALTH CARE EDUCATION/TRAINING PROGRAM

## 2024-02-13 RX ORDER — AZELASTINE HYDROCHLORIDE 137 UG/1
1-2 SPRAY, METERED NASAL 2 TIMES DAILY
Qty: 30 ML | Refills: 3 | Status: SHIPPED | OUTPATIENT
Start: 2024-02-13

## 2024-02-13 RX ORDER — METHYLPREDNISOLONE 4 MG/1
TABLET ORAL
Qty: 1 EACH | Refills: 0 | Status: SHIPPED | OUTPATIENT
Start: 2024-02-13

## 2024-02-13 RX ORDER — AMOXICILLIN AND CLAVULANATE POTASSIUM 875; 125 MG/1; MG/1
1 TABLET, FILM COATED ORAL 2 TIMES DAILY
Qty: 20 TABLET | Refills: 0 | Status: SHIPPED | OUTPATIENT
Start: 2024-02-13 | End: 2024-02-23

## 2024-02-13 RX ORDER — FLUTICASONE PROPIONATE 50 MCG
2 SPRAY, SUSPENSION (ML) NASAL DAILY
Qty: 3 EACH | Refills: 3 | Status: SHIPPED | OUTPATIENT
Start: 2024-02-13

## 2024-02-13 NOTE — PROGRESS NOTES
OFFICE NOTE     Patient ID: Mechelle Wise is a 34 year old female.  Today's Date: 02/13/24  Chief Complaint: Sore Throat (Sore throat and cough for 20 days, coughing at night unable to sleep)    Intepreter Xochitil [id number]      Sore Throat   Associated symptoms include congestion, ear pain, headaches and a hoarse voice. Pertinent negatives include no coughing.   Sinusitis  This is a new problem. The current episode started 1 to 4 weeks ago. The problem has been gradually worsening since onset. The maximum temperature recorded prior to her arrival was 100.4 - 100.9 F. The pain is moderate. Associated symptoms include chills, congestion, ear pain, headaches, a hoarse voice, sinus pressure, sneezing and a sore throat. Pertinent negatives include no coughing. Past treatments include acetaminophen and oral decongestants.         Vitals:    02/13/24 1137   BP: 104/64   Pulse: 77   Temp: 98.5 °F (36.9 °C)   TempSrc: Oral   SpO2: 99%   Weight: 149 lb (67.6 kg)   Height: 4' 11\" (1.499 m)     body mass index is 30.09 kg/m².  BP Readings from Last 3 Encounters:   02/13/24 104/64   11/06/23 105/63   08/08/23 102/65     The ASCVD Risk score (Antionette DK, et al., 2019) failed to calculate for the following reasons:    The 2019 ASCVD risk score is only valid for ages 40 to 79      Medications reviewed:  Current Outpatient Medications   Medication Sig Dispense Refill    amoxicillin clavulanate 875-125 MG Oral Tab Take 1 tablet by mouth 2 (two) times daily for 10 days. 20 tablet 0    azelastine 137 MCG/SPRAY Nasal Solution 1-2 sprays by Nasal route in the morning and 1-2 sprays before bedtime. FOR SINUS SYMPTOMS/NASAL CONGESTION.. 30 mL 3    fluticasone propionate 50 MCG/ACT Nasal Suspension 2 sprays by Each Nare route daily. FOR NASAL CONGESTION/SINUS SYMPTOMS. 3 each 3    Benzocaine-Menthol 6-10 MG Mouth/Throat Lozenge Take 1 lozenge by mouth every 2 (two) hours as needed for Pain. 20 lozenge 0    methylPREDNISolone  4 MG Oral Tablet Therapy Pack Take as directed with food. 1 each 0    methylPREDNISolone 4 MG Oral Tablet Therapy Pack Take as directed (Patient not taking: Reported on 2/13/2024) 1 each 0    clotrimazole-betamethasone 1-0.05 % External Cream Apply 1 Application topically in the morning and 1 Application before bedtime. (Patient not taking: Reported on 2/13/2024) 60 g 1         Assessment & Plan    1. Acute bacterial sinusitis (Primary)  -     Amoxicillin-Pot Clavulanate; Take 1 tablet by mouth 2 (two) times daily for 10 days.  Dispense: 20 tablet; Refill: 0  -     Azelastine HCl; 1-2 sprays by Nasal route in the morning and 1-2 sprays before bedtime. FOR SINUS SYMPTOMS/NASAL CONGESTION..  Dispense: 30 mL; Refill: 3  -     Fluticasone Propionate; 2 sprays by Each Nare route daily. FOR NASAL CONGESTION/SINUS SYMPTOMS.  Dispense: 3 each; Refill: 3  -     Benzocaine-Menthol; Take 1 lozenge by mouth every 2 (two) hours as needed for Pain.  Dispense: 20 lozenge; Refill: 0  -     methylPREDNISolone; Take as directed with food.  Dispense: 1 each; Refill: 0    Patient presenting URI and now sinus congestion tenderness and erythematous nasal turbinates consistent with acute secondary acute bacterial rhinosinusitis.  Plan:  -Use Flonase and Azelastine 1 puff each nostril daily for next month or till symptom resolves  -Start Empiric Augmentin1 tab BID for total 10 day course  -medrol dose pack for inflammation  -if no improvement in 1 week follow up in clinic  -take hot showers, drink tea and increase fluid intake      Follow Up: As needed/if symptoms worsen or Return in about 1 week (around 2/20/2024), or if symptoms worsen or fail to improve..         Objective/ Results:   Physical Exam  Constitutional:       Appearance: She is well-developed.   HENT:      Nose: Congestion and rhinorrhea present.      Right Turbinates: Enlarged and swollen.      Left Turbinates: Enlarged and swollen.   Cardiovascular:      Rate and Rhythm:  Normal rate and regular rhythm.      Heart sounds: Normal heart sounds.   Pulmonary:      Effort: Pulmonary effort is normal.      Breath sounds: Normal breath sounds.   Abdominal:      General: Bowel sounds are normal.      Palpations: Abdomen is soft.   Skin:     General: Skin is warm and dry.   Neurological:      Mental Status: She is alert and oriented to person, place, and time.      Deep Tendon Reflexes: Reflexes are normal and symmetric.        Reviewed:    There are no problems to display for this patient.     No Known Allergies     Social History     Socioeconomic History    Marital status: Single   Tobacco Use    Smoking status: Never     Passive exposure: Never    Smokeless tobacco: Never   Vaping Use    Vaping Use: Never used   Substance and Sexual Activity    Alcohol use: Never    Drug use: Never      Review of Systems   Constitutional:  Positive for chills.   HENT:  Positive for congestion, ear pain, hoarse voice, postnasal drip, rhinorrhea, sinus pressure, sneezing and sore throat.    Respiratory: Negative.  Negative for cough.    Cardiovascular: Negative.    Gastrointestinal: Negative.    Skin: Negative.    Neurological:  Positive for headaches.     All other systems negative unless otherwise stated in ROS or HPI above.       Emory Singh MD  Internal Medicine       Call office with any questions or seek emergency care if necessary.   Patient understands and agrees to follow directions and advice.      ----------------------------------------- PATIENT INSTRUCTIONS-----------------------------------------     There are no Patient Instructions on file for this visit.

## 2024-03-31 ENCOUNTER — HOSPITAL ENCOUNTER (OUTPATIENT)
Age: 34
Discharge: HOME | End: 2024-03-31
Payer: COMMERCIAL

## 2024-03-31 VITALS
OXYGEN SATURATION: 99 % | DIASTOLIC BLOOD PRESSURE: 68 MMHG | TEMPERATURE: 100 F | SYSTOLIC BLOOD PRESSURE: 132 MMHG | HEART RATE: 115 BPM | RESPIRATION RATE: 18 BRPM

## 2024-03-31 DIAGNOSIS — J11.1 INFLUENZA-LIKE ILLNESS: ICD-10-CM

## 2024-03-31 DIAGNOSIS — R50.9 FEVER, UNSPECIFIED FEVER CAUSE: Primary | ICD-10-CM

## 2024-03-31 LAB
B-HCG UR QL: NEGATIVE
BILIRUB UR QL STRIP: NEGATIVE
CLARITY UR: CLEAR
COLOR UR: YELLOW
GLUCOSE UR STRIP-MCNC: NEGATIVE MG/DL
KETONES UR STRIP-MCNC: NEGATIVE MG/DL
LEUKOCYTE ESTERASE UR QL STRIP: NEGATIVE
NITRITE UR QL STRIP: NEGATIVE
PH UR STRIP: 7 [PH]
POCT INFLUENZA A: NEGATIVE
POCT INFLUENZA B: NEGATIVE
PROT UR STRIP-MCNC: NEGATIVE MG/DL
SARS-COV-2 RNA RESP QL NAA+PROBE: NOT DETECTED
SP GR UR STRIP: 1.01
UROBILINOGEN UR STRIP-ACNC: <2 MG/DL

## 2024-03-31 RX ORDER — IBUPROFEN 600 MG/1
600 TABLET ORAL ONCE
Status: COMPLETED | OUTPATIENT
Start: 2024-03-31 | End: 2024-03-31

## 2024-03-31 NOTE — DISCHARGE INSTRUCTIONS
El COVID y la gripe son negativos, darshana los síntomas parecen muy similares a los de la gripe. [place] Tylenol o Motrin según sea necesario para la fiebre. [place] también ayudará con guzmán malestar. Adele mucha agua, agregue electrolitos. Descansar. No hay indicación de antibióticos. Los síntomas pueden persistir anjana varios días más. Surendra un seguimiento con guzmán médico de atención primaria si no mejora.    COVID and flu are negative but symptoms appear very similar to the flu.  Take Tylenol or Motrin as needed for your fever.  This will also help with your discomfort.  Drink plenty of water, add electrolytes.  Rest.  No indication for antibiotics. Symptoms may persist for several more days.  Follow-up with your primary doctor if you are not improving.

## 2024-03-31 NOTE — ED PROVIDER NOTES
Patient Seen in: [facility]      History     Chief Complaint   Patient presents with    Fever     Stated Complaint: BODY ACHES , HEADACHE    Subjective:   34 year old female with no past medical history presents from home with complaint of flulike symptoms. Woke up today with headache, sore throat, body aches. States fever was \"4 or 5\". No antipyretics taken. No COVID testing done at home. Denies vomiting/diarrhea. No cough.  States some urinary discomfort but attributes this to having the fever, states it does not feel like a urine infection.        Objective:   Past Medical History:   Diagnosis Date    Renal stones             HISTORY:  Past Medical History:   Diagnosis Date    Renal stones       History reviewed. No pertinent surgical history.   No family history on file.   Social History     Socioeconomic History    Marital status: Single   Tobacco Use    Smoking status: Never     Passive exposure: Never    Smokeless tobacco: Never   Vaping Use    Vaping Use: Never used   Substance and Sexual Activity    Alcohol use: Never    Drug use: Never          History reviewed. No pertinent surgical history.             Social History     Socioeconomic History    Marital status: Single   Tobacco Use    Smoking status: Never     Passive exposure: Never    Smokeless tobacco: Never   Vaping Use    Vaping Use: Never used   Substance and Sexual Activity    Alcohol use: Never    Drug use: Never              Review of Systems    Positive for stated complaint: BODY ACHES , HEADACHE  Other systems are as noted in HPI.  Constitutional and vital signs reviewed.      All other systems reviewed and negative except as noted above.    Physical Exam     ED Triage Vitals [03/31/24 1445]   /68   Pulse (!) 129   Resp 20   Temp (!) 102.5 °F (39.2 °C)   Temp src Oral   SpO2 98 %   O2 Device None (Room air)       Current:/68   Pulse (!) 129   Temp (!) 102.5 °F (39.2 °C) (Oral)   Resp 20   LMP  (Approximate)    SpO2 98%         Physical Exam  Vitals and nursing note reviewed.   Constitutional:       General: She is in acute distress (ill appearing but nontoxic).      Appearance: Normal appearance. She is not ill-appearing or toxic-appearing.   HENT:      Head: Normocephalic and atraumatic.      Right Ear: Tympanic membrane, ear canal and external ear normal.      Left Ear: Tympanic membrane, ear canal and external ear normal.      Nose: Nose normal.      Mouth/Throat:      Mouth: Mucous membranes are moist.      Pharynx: Oropharynx is clear. No pharyngeal swelling or posterior oropharyngeal erythema.      Tonsils: No tonsillar exudate.   Eyes:      Pupils: Pupils are equal, round, and reactive to light.   Cardiovascular:      Rate and Rhythm: Regular rhythm. Tachycardia present.      Pulses: Normal pulses.      Comments: Hr 129, fever 102.5  Pulmonary:      Effort: Pulmonary effort is normal. No respiratory distress.      Breath sounds: Normal breath sounds.      Comments: Lungs clear.  No adventitious lung sounds.  No distress.  No hypoxia.  Pulse ox 98% ra. Which is normal    Abdominal:      General: Abdomen is flat.      Palpations: Abdomen is soft.      Tenderness: There is no abdominal tenderness. There is no right CVA tenderness or left CVA tenderness.   Musculoskeletal:         General: No signs of injury. Normal range of motion.      Cervical back: Normal range of motion and neck supple.   Lymphadenopathy:      Cervical: No cervical adenopathy.   Skin:     General: Skin is warm and dry.      Capillary Refill: Capillary refill takes less than 2 seconds.   Neurological:      General: No focal deficit present.      Mental Status: She is alert and oriented to person, place, and time.      GCS: GCS eye subscore is 4. GCS verbal subscore is 5. GCS motor subscore is 6.   Psychiatric:         Mood and Affect: Mood normal.         Behavior: Behavior normal.         Thought Content: Thought content normal.         Judgment:  Judgment normal.         ED Course     Labs Reviewed   [facility] POCT URINALYSIS DIPSTICK - Abnormal; Notable for the following components:       Result Value    Blood, Urine Trace-lysed (*)     All other components within normal limits   POCT FLU TEST - Normal    Narrative:     This assay is a rapid molecular in vitro test utilizing nucleic acid amplification of influenza A and B viral RNA.   RAPID SARS-COV-2 BY PCR - Normal     Recent Results (from the past 24 hour(s))   POCT Flu Test    Collection Time: 03/31/24  2:53 PM    Specimen: Nares; Other   Result Value Ref Range    POCT INFLUENZA A Negative Negative    POCT INFLUENZA B Negative Negative   Rapid SARS-CoV-2 by PCR    Collection Time: 03/31/24  3:19 PM    Specimen: Nares; Other   Result Value Ref Range    Rapid SARS-CoV-2 by PCR Not Detected Not Detected   POCT Urinalysis Dipstick    Collection Time: 03/31/24  3:23 PM   Result Value Ref Range    Urine Color Yellow Yellow    Urine Clarity Clear Clear    Specific Gravity, Urine 1.015 1.005 - 1.030    PH, Urine 7.0 5.0 - 8.0    Protein urine Negative Negative mg/dL    Glucose, Urine Negative Negative mg/dL    Ketone, Urine Negative Negative mg/dL    Bilirubin, Urine Negative Negative    Blood, Urine Trace-lysed (A) Negative    Nitrite Urine Negative Negative    Urobilinogen urine <2.0 <2.0 mg/dL    Leukocyte esterase urine Negative Negative     MDM        Medical Decision Making  Differential diagnosis: Flu, COVID, UTI  Flu testing negative  COVID testing negative  UA is negative for infection.  Pregnancy test is negative.  Febrile here but nontoxic-appearing.  Fever control with Motrin and oral fluids  Symptoms appear consistent with the flu despite negative flu testing  Recommend quarantining while febrile.  Fever control with Tylenol and Motrin.  Push oral fluids.  Rest.  Results and plan of care discussed with the patient/family. They are in agreement with discharge. They understand to follow up with their  primary doctor or the referral physician for further evaluation, especially if no improvement.  Also discussed the limitations of immediate care, patient is aware that if symptoms are worse they should go to the emergency room. Verbal and written discharge instructions were given.       Problems Addressed:  Fever, unspecified fever cause: acute illness or injury  Influenza-like illness: acute illness or injury    Amount and/or Complexity of Data Reviewed  Labs: ordered. Decision-making details documented in ED Course.    Risk  OTC drugs.        Disposition and Plan     Clinical Impression:  1. Fever, unspecified fever cause    2. Influenza-like illness         Disposition:  Discharge  3/31/2024  3:37 pm    Follow-up:  Trip Obando MD  [address] [place] [address]  [phone]                Medications Prescribed:  Current Discharge Medication List

## 2024-08-08 ENCOUNTER — HOSPITAL ENCOUNTER (OUTPATIENT)
Age: 34
Discharge: ANOTHER HOSPITAL | End: 2024-08-08
Payer: COMMERCIAL

## 2024-08-08 VITALS
TEMPERATURE: 97 F | DIASTOLIC BLOOD PRESSURE: 75 MMHG | SYSTOLIC BLOOD PRESSURE: 114 MMHG | RESPIRATION RATE: 20 BRPM | OXYGEN SATURATION: 99 % | HEART RATE: 82 BPM

## 2024-08-08 DIAGNOSIS — Z32.01 PREGNANCY TEST POSITIVE (HCC): Primary | ICD-10-CM

## 2024-08-08 DIAGNOSIS — R10.30 LOWER ABDOMINAL PAIN: ICD-10-CM

## 2024-08-08 LAB — B-HCG UR QL: POSITIVE

## 2024-08-08 PROCEDURE — 99215 OFFICE O/P EST HI 40 MIN: CPT | Performed by: NURSE PRACTITIONER

## 2024-08-08 PROCEDURE — 81025 URINE PREGNANCY TEST: CPT | Performed by: NURSE PRACTITIONER

## 2024-08-08 NOTE — ED INITIAL ASSESSMENT (HPI)
Patient arrives ambulatory with c/o headache, back pain, lower abdominal pain, dizziness after lifting 3 boxes of about 25 pounds.  Patient concerned about being pregnant. LMP 24. Reports positive home pregnancy test. Denies vaginal bleeding. .     used: [id number] and [id number].

## 2024-08-08 NOTE — DISCHARGE INSTRUCTIONS
Tu prueba de embarazo es positiva. Debe acudir a urgencias para elizabeth mayor evaluación de kesha síntomas.    Your pregnancy test is positive. You should go to the emergency room for further evaluation of your symptoms

## 2024-08-08 NOTE — ED PROVIDER NOTES
Patient Seen in: [facility]      History     Chief Complaint   Patient presents with    Abdomen/Flank Pain     Stated Complaint: dizzy, headaches, stomach pain  Subjective:   34-year-old female with no past medical history presents from home.  Patient is here with multiple complaints and requests.  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  States she was able to lay down and rest after work and symptoms improved.  The dizziness is resolved but states she is still having a headache and low abdominal pain.  She is also requesting a pregnancy test.  LMP 6/27.  States she had a positive test at home and would like confirmation here.  G5, P4.  No vaginal bleeding.  She is also requesting a note for work that states she cannot lift over 30 pounds.  No pain medication taken at home.    The history is provided by the patient. A  was used (Matthias #[id number]).     Objective:   Past Medical History:    Renal stones            History reviewed. No pertinent surgical history.           Social History     Socioeconomic History    Marital status: Single   Tobacco Use    Smoking status: Never     Passive exposure: Never    Smokeless tobacco: Never   Vaping Use    Vaping status: Never Used   Substance and Sexual Activity    Alcohol use: Never    Drug use: Never            Review of Systems    Positive for stated complaint: Abdomen/Flank Pain    Other systems are as noted in HPI.  Constitutional and vital signs reviewed.      All other systems reviewed and negative except as noted above.    Physical Exam     ED Triage Vitals [08/08/24 1507]   /75   Pulse 82   Resp 20   Temp 96.6 °F (35.9 °C)   Temp src    SpO2 99 %   O2 Device None (Room air)     Current:/75   Pulse 82   Temp 96.6 °F (35.9 °C)   Resp 20   LMP 06/27/2024   SpO2 99%     Physical  Exam  Vitals and nursing note reviewed.   Constitutional:       General: She is not in acute distress.     Appearance: Normal appearance. She is not ill-appearing or toxic-appearing.   HENT:      Head: Normocephalic and atraumatic.      Nose: Nose normal.      Mouth/Throat:      Mouth: Mucous membranes are moist.      Pharynx: Oropharynx is clear.   Eyes:      Pupils: Pupils are equal, round, and reactive to light.   Cardiovascular:      Rate and Rhythm: Normal rate and regular rhythm.      Pulses: Normal pulses.   Pulmonary:      Effort: Pulmonary effort is normal. No respiratory distress.      Breath sounds: Normal breath sounds.      Comments: Lungs clear.  No adventitious lung sounds.  No distress.  No hypoxia.  Pulse ox 99% ra. Which is normal    Abdominal:      General: Abdomen is flat.      Palpations: Abdomen is soft.      Tenderness: There is abdominal tenderness in the suprapubic area. There is no right CVA tenderness or left CVA tenderness.      Comments: Mild diffuse lower tenderness   Musculoskeletal:         General: No signs of injury. Normal range of motion.      Cervical back: Normal range of motion and neck supple.   Skin:     General: Skin is warm and dry.      Capillary Refill: Capillary refill takes less than 2 seconds.   Neurological:      General: No focal deficit present.      Mental Status: She is alert and oriented to person, place, and time.      GCS: GCS eye subscore is 4. GCS verbal subscore is 5. GCS motor subscore is 6.   Psychiatric:         Mood and Affect: Mood normal.         Behavior: Behavior normal.         Thought Content: Thought content normal.         Judgment: Judgment normal.         ED Course   Radiology:  No results found.  Labs Reviewed   POCT PREGNANCY URINE - Abnormal; Notable for the following components:       Result Value    POCT Urine Pregnancy Positive (*)     All other components within normal limits       MDM     Medical Decision Making  Differential diagnoses  reflecting the complexity of care include: pregnancy, ectopic pregnancy, muscle strain, SAH, need for work note  Patient with sudden onset and severe headache with back pain, dizziness and abdominal pain after lifting at work  She is well-appearing on exam, no acute pain distress.  No acute neurodeficit.  She does have mild lower abdominal tenderness with a positive pregnancy test  Recommend evaluation in the emergency room  She will take a taxi to [facility] emergency room  The case was discussed with attending Dr Rueda. They are in agreement with the plan of care.             Problems Addressed:  Lower abdominal pain: acute illness or injury  Pregnancy test positive (HCC): acute illness or injury    Amount and/or Complexity of Data Reviewed  Independent Historian: friend  Labs: ordered. Decision-making details documented in ED Course.        Disposition and Plan     Clinical Impression:  1. Pregnancy test positive (HCC)    2. Lower abdominal pain         Disposition:  Ic to ed  8/8/2024  3:32 pm    Follow-up:  No follow-up provider specified.        Medications Prescribed:  Discharge Medication List as of 8/8/2024  3:32 PM

## 2024-08-27 ENCOUNTER — OFFICE VISIT (OUTPATIENT)
Dept: FAMILY MEDICINE CLINIC | Facility: CLINIC | Age: 34
End: 2024-08-27

## 2024-08-27 ENCOUNTER — LAB ENCOUNTER (OUTPATIENT)
Dept: LAB | Age: 34
End: 2024-08-27
Attending: FAMILY MEDICINE
Payer: COMMERCIAL

## 2024-08-27 VITALS
HEART RATE: 69 BPM | WEIGHT: 156 LBS | HEIGHT: 59 IN | BODY MASS INDEX: 31.45 KG/M2 | DIASTOLIC BLOOD PRESSURE: 62 MMHG | SYSTOLIC BLOOD PRESSURE: 101 MMHG

## 2024-08-27 DIAGNOSIS — B35.3 TINEA PEDIS OF BOTH FEET: ICD-10-CM

## 2024-08-27 DIAGNOSIS — Z3A.08 8 WEEKS GESTATION OF PREGNANCY (HCC): Primary | ICD-10-CM

## 2024-08-27 DIAGNOSIS — Z3A.08 8 WEEKS GESTATION OF PREGNANCY (HCC): ICD-10-CM

## 2024-08-27 LAB
ALBUMIN SERPL-MCNC: 4.2 G/DL (ref 3.2–4.8)
ALBUMIN/GLOB SERPL: 1.5 {RATIO} (ref 1–2)
ALP LIVER SERPL-CCNC: 80 U/L
ALT SERPL-CCNC: 43 U/L
ANION GAP SERPL CALC-SCNC: 3 MMOL/L (ref 0–18)
AST SERPL-CCNC: 41 U/L (ref ?–34)
BASOPHILS # BLD AUTO: 0.09 X10(3) UL (ref 0–0.2)
BASOPHILS NFR BLD AUTO: 1 %
BILIRUB SERPL-MCNC: 0.9 MG/DL (ref 0.3–1.2)
BUN BLD-MCNC: 8 MG/DL (ref 9–23)
BUN/CREAT SERPL: 11 (ref 10–20)
CALCIUM BLD-MCNC: 9.6 MG/DL (ref 8.7–10.4)
CHLORIDE SERPL-SCNC: 111 MMOL/L (ref 98–112)
CO2 SERPL-SCNC: 25 MMOL/L (ref 21–32)
CREAT BLD-MCNC: 0.73 MG/DL
DEPRECATED RDW RBC AUTO: 41.2 FL (ref 35.1–46.3)
EGFRCR SERPLBLD CKD-EPI 2021: 111 ML/MIN/1.73M2 (ref 60–?)
EOSINOPHIL # BLD AUTO: 0.37 X10(3) UL (ref 0–0.7)
EOSINOPHIL NFR BLD AUTO: 4.1 %
ERYTHROCYTE [DISTWIDTH] IN BLOOD BY AUTOMATED COUNT: 12.5 % (ref 11–15)
FASTING STATUS PATIENT QL REPORTED: NO
GLOBULIN PLAS-MCNC: 2.8 G/DL (ref 2–3.5)
GLUCOSE BLD-MCNC: 86 MG/DL (ref 70–99)
HBV SURFACE AG SER-ACNC: 0.18 [IU]/L
HBV SURFACE AG SERPL QL IA: NONREACTIVE
HCT VFR BLD AUTO: 39.1 %
HGB BLD-MCNC: 13.3 G/DL
IMM GRANULOCYTES # BLD AUTO: 0.06 X10(3) UL (ref 0–1)
IMM GRANULOCYTES NFR BLD: 0.7 %
LYMPHOCYTES # BLD AUTO: 2.11 X10(3) UL (ref 1–4)
LYMPHOCYTES NFR BLD AUTO: 23.4 %
MCH RBC QN AUTO: 30.6 PG (ref 26–34)
MCHC RBC AUTO-ENTMCNC: 34 G/DL (ref 31–37)
MCV RBC AUTO: 90.1 FL
MONOCYTES # BLD AUTO: 0.78 X10(3) UL (ref 0.1–1)
MONOCYTES NFR BLD AUTO: 8.7 %
NEUTROPHILS # BLD AUTO: 5.59 X10 (3) UL (ref 1.5–7.7)
NEUTROPHILS # BLD AUTO: 5.59 X10(3) UL (ref 1.5–7.7)
NEUTROPHILS NFR BLD AUTO: 62.1 %
OSMOLALITY SERPL CALC.SUM OF ELEC: 286 MOSM/KG (ref 275–295)
PLATELET # BLD AUTO: 283 10(3)UL (ref 150–450)
POTASSIUM SERPL-SCNC: 4.7 MMOL/L (ref 3.5–5.1)
PROT SERPL-MCNC: 7 G/DL (ref 5.7–8.2)
RBC # BLD AUTO: 4.34 X10(6)UL
RUBV IGG SER QL: POSITIVE
RUBV IGG SER-ACNC: 93 IU/ML (ref 10–?)
SODIUM SERPL-SCNC: 139 MMOL/L (ref 136–145)
T PALLIDUM AB SER QL IA: NONREACTIVE
TSI SER-ACNC: 1.03 MIU/ML (ref 0.55–4.78)
WBC # BLD AUTO: 9 X10(3) UL (ref 4–11)

## 2024-08-27 PROCEDURE — 87340 HEPATITIS B SURFACE AG IA: CPT

## 2024-08-27 PROCEDURE — 87186 SC STD MICRODIL/AGAR DIL: CPT

## 2024-08-27 PROCEDURE — 3008F BODY MASS INDEX DOCD: CPT | Performed by: FAMILY MEDICINE

## 2024-08-27 PROCEDURE — 80053 COMPREHEN METABOLIC PANEL: CPT

## 2024-08-27 PROCEDURE — 87184 SC STD DISK METHOD PER PLATE: CPT

## 2024-08-27 PROCEDURE — 3078F DIAST BP <80 MM HG: CPT | Performed by: FAMILY MEDICINE

## 2024-08-27 PROCEDURE — 87086 URINE CULTURE/COLONY COUNT: CPT

## 2024-08-27 PROCEDURE — 36415 COLL VENOUS BLD VENIPUNCTURE: CPT

## 2024-08-27 PROCEDURE — 87077 CULTURE AEROBIC IDENTIFY: CPT

## 2024-08-27 PROCEDURE — 87591 N.GONORRHOEAE DNA AMP PROB: CPT

## 2024-08-27 PROCEDURE — 85025 COMPLETE CBC W/AUTO DIFF WBC: CPT

## 2024-08-27 PROCEDURE — 86762 RUBELLA ANTIBODY: CPT

## 2024-08-27 PROCEDURE — 99214 OFFICE O/P EST MOD 30 MIN: CPT | Performed by: FAMILY MEDICINE

## 2024-08-27 PROCEDURE — 84443 ASSAY THYROID STIM HORMONE: CPT

## 2024-08-27 PROCEDURE — 3074F SYST BP LT 130 MM HG: CPT | Performed by: FAMILY MEDICINE

## 2024-08-27 PROCEDURE — 86780 TREPONEMA PALLIDUM: CPT | Performed by: FAMILY MEDICINE

## 2024-08-27 PROCEDURE — 87491 CHLMYD TRACH DNA AMP PROBE: CPT

## 2024-08-27 RX ORDER — CLOTRIMAZOLE 1 %
1 CREAM (GRAM) TOPICAL 2 TIMES DAILY
Qty: 30 G | Refills: 0 | Status: SHIPPED | OUTPATIENT
Start: 2024-08-27

## 2024-08-27 RX ORDER — PNV 119/IRON FUM/FOLIC ACID 29 MG-1 MG
1 TABLET ORAL DAILY
Qty: 90 TABLET | Refills: 1 | Status: SHIPPED | OUTPATIENT
Start: 2024-08-27

## 2024-08-27 NOTE — PROGRESS NOTES
8/27/2024  2:55 PM    Mechelle Wise is a 34 year old female.    Chief complaint(s):   Chief Complaint   Patient presents with    Pregnancy     Requesting a letter for her job that states that she is currently pregant    Ear Problem     Right ear hearing loss, ringing      HPI:     Mechelle Wise primary complaint is regarding as above.       Mechelle Wiseis a 34 year old female is her for possible pregnancy.  Menarche occurred at age of 12 .  Her last normal period was on 06/27/2024.  MIKE 04/04/2025 @ 81/2 wks EGA. Previous menstrual pattern is described as  regular in frequency, averaging 3 days in duration.  She is G 5, P 4, Ab 0 (induced), Ab 0 (spontaneous).    Pubertal development was essentially normal with normal breast development and distribution of pubic hair, and no reported hirsutism. She is sexually active and is not using any means of contraception. Significant associated symptoms include nausea, headaches. She has Vaginal bleeding, denies any abdominal-pelvic cramps, has headaches.       HISTORY:  Past Medical History:    Renal stones      No past surgical history on file.   No family history on file.   Social History:   Social History     Socioeconomic History    Marital status: Single   Tobacco Use    Smoking status: Never     Passive exposure: Never    Smokeless tobacco: Never   Vaping Use    Vaping status: Never Used   Substance and Sexual Activity    Alcohol use: Never    Drug use: Never     Social Determinants of Health     Financial Resource Strain: Low Risk  (8/23/2024)    Received from [facility]    Overall Financial Resource Strain (CARDIA)     Difficulty of Paying Living Expenses: Not hard at all   Food Insecurity: No Food Insecurity (8/23/2024)    Received from [facility]    Food Insecurity     Within the past 30 days, I worried whether my food would run out before I got money to buy more. / En los últimos 30 días, me preocupó que la  comida se podía acabar antes de tener dinero para compr...: Never true / Nunca     Within the past 30 days, the food that I bought just didn't last, and I didn't have money to get more. / En los últimos 30 días, La comida que compré no rindió lo suficiente, y no tenía dinero para...: Never true / Nunca   Housing Stability: Unknown (8/23/2024)    Received from [facility]    Housing Stability Vital Sign     Unable to Pay for Housing in the Last Year: No     Homeless in the Last Year: No        Immunizations:   Immunization History   Administered Date(s) Administered    TDAP 09/09/2023       Medications (Active prior to today's visit):  Current Outpatient Medications   Medication Sig Dispense Refill    clotrimazole 1 % External Cream Apply 1 Application topically 2 (two) times daily. 30 g 0    Prenatal Vit-DSS-Fe Fum-FA (PRENATAL 19) Oral Tab Take 1 tablet by mouth daily. 90 tablet 1       Allergies:  No Known Allergies      ROS:   Review of Systems   Constitutional:  Negative for appetite change and fever.   Eyes:  Negative for visual disturbance.   Respiratory:  Negative for shortness of breath.    Cardiovascular:  Negative for chest pain.   Gastrointestinal:  Positive for nausea. Negative for abdominal pain and vomiting.   Genitourinary:  Positive for menstrual problem (pregnancy).   Musculoskeletal:  Negative for back pain.   Skin:  Negative for rash.   Neurological:  Positive for headaches. Negative for dizziness.       PHYSICAL EXAM:   VS: /62 (BP Location: Right arm, Patient Position: Sitting, Cuff Size: adult)   Pulse 69   Ht 4' 11\" (1.499 m)   Wt 156 lb (70.8 kg)   LMP 06/27/2024   BMI 31.51 kg/m²     Physical Exam  Vitals reviewed.   Constitutional:       General: She is not in acute distress.     Appearance: Normal appearance.   HENT:      Head: Normocephalic.   Eyes:      Conjunctiva/sclera: Conjunctivae normal.   Cardiovascular:      Rate and Rhythm: Normal rate.    Pulmonary:      Effort: Pulmonary effort is normal.   Abdominal:      General: Bowel sounds are normal.      Palpations: Abdomen is soft.      Tenderness: There is no abdominal tenderness. There is no right CVA tenderness or left CVA tenderness.   Musculoskeletal:      Cervical back: Neck supple.   Feet:      Comments: Plantar pealing skin with fissures   Skin:     Findings: No rash.   Psychiatric:         Mood and Affect: Mood normal.         LABORATORY RESULTS:      Results for orders placed or performed during the hospital encounter of 08/08/24   POCT Pregnancy, Urine   Result Value Ref Range    POCT Urine Pregnancy Positive (A) Negative       EKG / Spirometry : -     Radiology: No results found.     ASSESSMENT/PLAN:   Assessment   Encounter Diagnoses   Name Primary?    8 weeks gestation of pregnancy (HCC) Yes    Tinea pedis of both feet        MEDICATIONS:     Requested Prescriptions     Signed Prescriptions Disp Refills    clotrimazole 1 % External Cream 30 g 0     Sig: Apply 1 Application topically 2 (two) times daily.    Prenatal Vit-DSS-Fe Fum-FA (PRENATAL 19) Oral Tab 90 tablet 1     Sig: Take 1 tablet by mouth daily.           LABORATORY & ORDERS:   Orders Placed This Encounter   Procedures    CBC With Differential With Platelet    TSH W Reflex To Free T4    Rubella, IGG    T Pallidum Screening Cascade    Hepatitis B Surface Antigen    Comp Metabolic Panel (14)    Urine Culture, Routine    Chlamydia/Gc Amplification       REFERRALS: OBG - INTERNAL,       Procedures    CBC With Differential With Platelet    TSH W Reflex To Free T4    Rubella, IGG    T Pallidum Screening Cascade    Hepatitis B Surface Antigen    Comp Metabolic Panel (14)    OBG Referral - [facility] ([facility])      Complexity of today's visit include:  (material ordered by another provider)  reviewed  *Reviewed - lab:                  RECOMMENDATIONS given include: Patient was reassured of  her medical condition and all questions and concerns  were answered. Patient was informed to please, call our office with any new or further questions or concerns that may come up in the near future. Notify Dr Pablo or the [facility] if there is a deterioration or worsening of the medical condition. Also, inform the doctor with any new symptoms or medications' side effects.      FOLLOW-UP: Schedule a follow-up visit in  prn.            Orders This Visit:  Orders Placed This Encounter   Procedures    CBC With Differential With Platelet    TSH W Reflex To Free T4    Rubella, IGG    T Pallidum Screening Cascade    Hepatitis B Surface Antigen    Comp Metabolic Panel (14)    Urine Culture, Routine    Chlamydia/Gc Amplification       Meds This Visit:  Requested Prescriptions     Signed Prescriptions Disp Refills    clotrimazole 1 % External Cream 30 g 0     Sig: Apply 1 Application topically 2 (two) times daily.    Prenatal Vit-DSS-Fe Fum-FA (PRENATAL 19) Oral Tab 90 tablet 1     Sig: Take 1 tablet by mouth daily.       Imaging & Referrals:  OBG - INTERNAL         LORE PABLO MD

## 2024-08-28 ENCOUNTER — TELEPHONE (OUTPATIENT)
Dept: FAMILY MEDICINE CLINIC | Facility: CLINIC | Age: 34
End: 2024-08-28

## 2024-08-28 LAB
C TRACH DNA SPEC QL NAA+PROBE: NEGATIVE
N GONORRHOEA DNA SPEC QL NAA+PROBE: NEGATIVE

## 2024-08-28 NOTE — TELEPHONE ENCOUNTER
Prior authorization initiated through cover my meds for Prenatal 19 tablets.  KEY:  [id number]  To initiate an authorization request for this medication, please contact [employer] at [phone].  Spoke with rep Jones this is a plan exclusion.    Please advise      Approx time taken on  encounter: 10 minutes

## 2024-08-28 NOTE — TELEPHONE ENCOUNTER
Prior Authorization Needed:    Current Outpatient Medications   Medication Sig Dispense Refill           Prenatal Vit-DSS-Fe Fum-FA (PRENATAL 19) Oral Tab Take 1 tablet by mouth daily. 90 tablet 1       Please visit [email or web address]    Key: [id number]  Last Name: Ac  : 1990    Please advise

## 2024-10-07 ENCOUNTER — TELEPHONE (OUTPATIENT)
Dept: OBGYN CLINIC | Facility: CLINIC | Age: 34
End: 2024-10-07

## 2024-10-07 NOTE — TELEPHONE ENCOUNTER
Patient called in to request a nurse to call to schedule missed menses appointment.  Brazilian interpretor needed

## 2024-10-07 NOTE — TELEPHONE ENCOUNTER
Pt name and  verified    Lmp   Irregular cycles  Denies hx of miscarriage    Pt calling to start prenatal care. Pt scheduled for New OB appointment 10/11 with Dr. Gonzalez. Pt aware of scheduling details.     Pt will call tomorrow to ask for hospital address. Unable to note down address at this time. Mychart is pending.

## 2024-10-08 ENCOUNTER — TELEPHONE (OUTPATIENT)
Dept: OBGYN CLINIC | Facility: CLINIC | Age: 34
End: 2024-10-08

## 2024-10-08 ENCOUNTER — HOSPITAL ENCOUNTER (EMERGENCY)
Facility: HOSPITAL | Age: 34
Discharge: HOME | End: 2024-10-08
Attending: EMERGENCY MEDICINE
Payer: COMMERCIAL

## 2024-10-08 VITALS
WEIGHT: 154.56 LBS | OXYGEN SATURATION: 99 % | SYSTOLIC BLOOD PRESSURE: 107 MMHG | HEART RATE: 79 BPM | RESPIRATION RATE: 16 BRPM | TEMPERATURE: 98 F | DIASTOLIC BLOOD PRESSURE: 70 MMHG | BODY MASS INDEX: 34.77 KG/M2 | HEIGHT: 56 IN

## 2024-10-08 DIAGNOSIS — S39.012A BACK STRAIN, INITIAL ENCOUNTER: Primary | ICD-10-CM

## 2024-10-08 PROCEDURE — 99283 EMERGENCY DEPT VISIT LOW MDM: CPT

## 2024-10-08 RX ORDER — ACETAMINOPHEN 325 MG/1
650 TABLET ORAL ONCE
Status: COMPLETED | OUTPATIENT
Start: 2024-10-08 | End: 2024-10-08

## 2024-10-08 NOTE — TELEPHONE ENCOUNTER
#[id number]    Name and  verified. Pt states she is pregnant and she has not seen a doctor yet. Pt states she currently has bad pain in her back and stomach starting yesterday after working multiple shifts. Pt also has ringing in ears. Pt advised ED visit for evaluation. Pt agrees; pt is waiting for a ride. Pt requesting to reschedule appointment with Dr. Gonzalez that was scheduled for . Appointment rescheduled . Pt requests to call back in the morning for office address.

## 2024-10-08 NOTE — ED INITIAL ASSESSMENT (HPI)
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 Patient did not hurt herself but had to operate two different machines that required her to walk from machine to machine.     Patient is 4 months pregnant  denies any discharge, cramping, abdominal pain or bleeding. Patient has first prenatal appointment with Dr Gonzalez 10/9. Called [facility] per RN call back once cleared by ED to inquire if patient needs to be monitored further.

## 2024-10-08 NOTE — ED PROVIDER NOTES
Patient Seen in: [facility] Emergency Department    History   No chief complaint on file.      HPI    History is provided by patient/independent historian: Patient  34 year old female with history of kidney stones here with complaints of back pain.  Patient states that symptoms started yesterday while she was at work walking between 2 machines back-and-forth.  Because the pain is bothering her, and she is developing right-sided ear pain as well, no drainage or hearing changes.  No urinary issues.  She has not had an ultrasound of this pregnancy yet.    History reviewed.   Past Medical History:    Renal stones         History reviewed. History reviewed. No pertinent surgical history.      Home Medications reviewed :  (Not in a hospital admission)        History reviewed.   Social History     Socioeconomic History    Marital status: Single   Tobacco Use    Smoking status: Never     Passive exposure: Never    Smokeless tobacco: Never   Vaping Use    Vaping status: Never Used   Substance and Sexual Activity    Alcohol use: Never    Drug use: Never         ROS  Review of Systems   HENT:  Positive for ear pain.    Respiratory:  Negative for shortness of breath.    Cardiovascular:  Negative for chest pain.   Genitourinary:  Negative for dysuria.   Musculoskeletal:  Positive for back pain.   All other systems reviewed and are negative.     All other pertinent organ systems are reviewed and are negative.      Physical Exam     ED Triage Vitals [10/08/24 1623]   BP 95/61   Pulse 83   Resp 19   Temp 98.2 °F (36.8 °C)   Temp src Oral   SpO2 99 %   O2 Device None (Room air)     Vital signs reviewed.      Physical Exam  Vitals and nursing note reviewed.   HENT:      Right Ear: Tympanic membrane normal.      Left Ear: Tympanic membrane normal.   Cardiovascular:      Pulses: Normal pulses.   Pulmonary:      Effort: No respiratory distress.   Abdominal:      General: There is no distension.   Musculoskeletal:      Comments:  Diffuse lumbar area tender to palpation, no midline spinal tenderness, ambulatory without assistance   Neurological:      Mental Status: She is alert.         ED Course       Labs:   Labs Reviewed - No data to display      My EKG Interpretation:   As reviewed and Interpreted by me      Imaging Results Available and Reviewed while in ED:   No results found.      Decision rules/scores evaluated: none      Diagnostic labs/tests considered but not ordered: CBC, BMP, UA, lumbar XR    ED Medications Administered:   Medications   acetaminophen (Tylenol) tab 650 mg (650 mg Oral Given 10/8/24 1801)              - FHT reassuring    [facility]       Medical Decision Making      Differential Diagnosis: After obtaining the patient's history, performing the physical exam and reviewing the diagnostics, multiple initial diagnoses were considered based on the presenting problem including back strain, nephrolithiasis, ectopic pregnancy, UTI    External document review: I personally reviewed available external medical records for any recent pertinent discharge summaries, testing, and procedures - the findings are as follows: 8/27/24 visit with Dr. Obando for 8wga    Complicating Factors: The patient already  has a past medical history of Renal stones. to contribute to the complexity of this ED evaluation.    Procedures performed: none    Discussed management with physician/appropriate source: none    Considered admission/deescalation of care for: none    Social determinants of health affecting patient care: none    Prescription medications considered: discussed continuing current medication regimen    The patient requires continuous monitoring for: back pain    Shared decision making: none        Disposition and Plan     Clinical Impression:  1. Back strain, initial encounter        Disposition:  Discharge    Follow-up:  Trip Obando MD  [address] [place] [id number]  [phone]    Follow up        Medications  Prescribed:  Discharge Medication List as of 10/8/2024  6:06 PM

## 2024-10-08 NOTE — TELEPHONE ENCOUNTER
Pt came to [facility] office stating that she is in a lot of pain. Pt is 14 weeks pregnant but has not been seen by any OB provider. Pt is Kiswahili speaking,  needed. Please advise.

## 2024-10-09 ENCOUNTER — INITIAL PRENATAL (OUTPATIENT)
Dept: OBGYN CLINIC | Facility: CLINIC | Age: 34
End: 2024-10-09

## 2024-10-09 ENCOUNTER — PATIENT OUTREACH (OUTPATIENT)
Dept: CASE MANAGEMENT | Age: 34
End: 2024-10-09

## 2024-10-09 VITALS
HEART RATE: 87 BPM | WEIGHT: 155 LBS | DIASTOLIC BLOOD PRESSURE: 65 MMHG | BODY MASS INDEX: 35 KG/M2 | SYSTOLIC BLOOD PRESSURE: 99 MMHG

## 2024-10-09 DIAGNOSIS — O09.522 SUPERVISION OF ELDERLY MULTIGRAVIDA IN SECOND TRIMESTER (HCC): Primary | ICD-10-CM

## 2024-10-09 PROBLEM — E66.811 OBESITY (BMI 30.0-34.9): Status: ACTIVE | Noted: 2024-10-09

## 2024-10-09 RX ORDER — FERROUS SULFATE 325(65) MG
325 TABLET ORAL EVERY OTHER DAY
Qty: 90 TABLET | Refills: 3 | Status: SHIPPED | OUTPATIENT
Start: 2024-10-09

## 2024-10-09 NOTE — PROGRESS NOTES
Mechelle Wise is a 34 year old female  Patient's last menstrual period was 2024.   Chief Complaint   Patient presents with    Prenatal Care     Pt here for new ob       OBSTETRICS HISTORY:     OB History    Para Term  AB Living   5 4 4         SAB IAB Ectopic Multiple Live Births                  # Outcome Date GA Lbr Andrew/2nd Weight Sex Type Anes PTL Lv   5 Current            4 Term 10/11/15    M NORMAL SPONT      3 Term 11    M NORMAL SPONT      2 Term 08    M NORMAL SPONT      1 Term 06    F NORMAL SPONT          GYNE HISTORY:     Hx Prior Abnormal Pap: No   Period Cycle (Days): 28 (10/9/2024  1:35 PM)  Period Duration (Days): 3 (10/9/2024  1:35 PM)  Period Flow: moderate (10/9/2024  1:35 PM)  Use of Birth Control (if yes, specify type): None (10/9/2024  1:35 PM)  Hx Prior Abnormal Pap: No (10/9/2024  1:35 PM)        Latest Ref Rng & Units 2023     3:27 PM   RECENT PAP RESULTS   INTERPRETATION/RESULT: Negative for intraepithelial lesion or malignancy Negative for intraepithelial lesion or malignancy          MEDICAL HISTORY:     Past Medical History:    Renal stones       SURGICAL HISTORY:     History reviewed. No pertinent surgical history.    SOCIAL HISTORY:     Social History     Socioeconomic History    Marital status: Single   Tobacco Use    Smoking status: Never     Passive exposure: Never    Smokeless tobacco: Never   Vaping Use    Vaping status: Never Used   Substance and Sexual Activity    Alcohol use: Never    Drug use: Never     Social Drivers of Health     Financial Resource Strain: Low Risk  (2024)    Received from [facility]    Overall Financial Resource Strain (CARDIA)     Difficulty of Paying Living Expenses: Not hard at all   Food Insecurity: No Food Insecurity (2024)    Received from [facility]    Food Insecurity     Within the past 30 days, I worried whether my food would run out before I  got money to buy more. / En los últimos 30 días, me preocupó que la comida se podía acabar antes de tener dinero para compr...: Never true / Nunca     Within the past 30 days, the food that I bought just didn't last, and I didn't have money to get more. / En los últimos 30 días, La comida que compré no rindió lo suficiente, y no tenía dinero para...: Never true / Nunca   Housing Stability: Unknown (8/23/2024)    Received from [facility]    Housing Stability Vital Sign     Unable to Pay for Housing in the Last Year: No     Homeless in the Last Year: No        FAMILY HISTORY:     History reviewed. No pertinent family history.    MEDICATIONS:       Current Outpatient Medications:     Prenatal Vit-DSS-Fe Fum-FA (PRENATAL 19) Oral Tab, Take 1 tablet by mouth daily., Disp: 90 tablet, Rfl: 1    ALLERGIES:     Allergies[1]      REVIEW OF SYSTEMS:     Constitutional:    denies fever / chills  Eyes:     denies blurred or double vision  Cardiovascular:  denies chest pain or palpitations  Respiratory:    denies shortness of breath  Gastrointestinal:  denies severe abdominal pain, frequent diarrhea or constipation, nausea / vomiting  Genitourinary:    denies dysuria, bothersome incontinence  Skin/Breast:   denies any breast pain, lumps, or discharge  Neurological:    denies frequent severe headaches  Psychiatric:   denies depression or anxiety, thoughts of harming self or others  Heme/Lymph:    denies easy bruising or bleeding      PHYSICAL EXAM:   Blood pressure 99/65, pulse 87, weight 155 lb (70.3 kg), last menstrual period 06/27/2024.  Constitutional:  well developed, well nourished  Head/Face:  normocephalic  Neck/Thyroid: thyroid symmetric, no thyromegaly, no nodules, no adenopathy  Lymphatic: no abnormal supraclavicular or axillary adenopathy is noted  Respiratory:      chest wall symmetric and nontender on palpation, clear to asculation bilateral, no wheezing, rales, ronchi, and resonance normal upon  percussion  Cardiovascular: chest normal in appearance, regular rate and rhythm, no murmurs, PMI palpated midclavicular line  Breast:   normal bilaterally without palpable masses, tenderness, asymmetry, nipple discharge, nipple retraction or skin changes bilaterally  Abdomen:   soft, nontender, nondistended, no masses  Skin/Hair:  no unusual rashes or bruises  Extremities:  no edema, no cyanosis, non tender bilaterally  Psychiatric:   oriented to time, place, person and situation. Appropriate mood and affect    Pelvic Exam:  External Genitalia:  normal appearance, hair distribution, and no lesions  Urethral Meatus:   normal in size, location, without lesions   Bladder:    no fullness, masses or tenderness  Vagina:    normal appearance without lesions, no abnormal discharge  Cervix:    No lesions, normal friability   Uterus:    normal in size, 8 wk sized, normal contour, position, mobility, without  motion tenderness  Adnexa:   normal without masses or tenderness  Perineum:   normal  Anus: no hemorroids         ASSESSMENT & PLAN:     Mechelle was seen today for prenatal care.    Diagnoses and all orders for this visit:    Supervision of elderly multigravida in second trimester (HCC)          FOLLOW-UP     No follow-ups on file.      Nikolay Gonzalez MD  10/9/2024         [1] No Known Allergies

## 2024-10-09 NOTE — PROGRESS NOTES
Mechelle Wise is a 34 year old female  Patient's last menstrual period was 2024 (exact date).   Chief Complaint   Patient presents with    Prenatal Care     Pt here for new ob       OBSTETRICS HISTORY:     OB History    Para Term  AB Living   5 4 4         SAB IAB Ectopic Multiple Live Births                  # Outcome Date GA Lbr Andrew/2nd Weight Sex Type Anes PTL Lv   5 Current            4 Term 10/11/15    M NORMAL SPONT      3 Term 11    M NORMAL SPONT      2 Term 08    M NORMAL SPONT      1 Term 06    F NORMAL SPONT          GYNE HISTORY:     Hx Prior Abnormal Pap: No   Period Cycle (Days): 28 (10/9/2024  1:35 PM)  Period Duration (Days): 3 (10/9/2024  1:35 PM)  Period Flow: moderate (10/9/2024  1:35 PM)  Use of Birth Control (if yes, specify type): None (10/9/2024  1:35 PM)  Hx Prior Abnormal Pap: No (10/9/2024  1:35 PM)        Latest Ref Rng & Units 2023     3:27 PM   RECENT PAP RESULTS   INTERPRETATION/RESULT: Negative for intraepithelial lesion or malignancy Negative for intraepithelial lesion or malignancy          MEDICAL HISTORY:     Past Medical History:    Renal stones       SURGICAL HISTORY:     History reviewed. No pertinent surgical history.    SOCIAL HISTORY:     Social History     Socioeconomic History    Marital status: Single   Tobacco Use    Smoking status: Never     Passive exposure: Never    Smokeless tobacco: Never   Vaping Use    Vaping status: Never Used   Substance and Sexual Activity    Alcohol use: Never    Drug use: Never     Social Drivers of Health     Financial Resource Strain: Low Risk  (2024)    Received from [facility]    Overall Financial Resource Strain (CARDIA)     Difficulty of Paying Living Expenses: Not hard at all   Food Insecurity: No Food Insecurity (2024)    Received from [facility]    Food Insecurity     Within the past 30 days, I worried whether my food would run  out before I got money to buy more. / En los últimos 30 días, me preocupó que la comida se podía acabar antes de tener dinero para compr...: Never true / Nunca     Within the past 30 days, the food that I bought just didn't last, and I didn't have money to get more. / En los últimos 30 días, La comida que compré no rindió lo suficiente, y no tenía dinero para...: Never true / Nunca   Housing Stability: Unknown (8/23/2024)    Received from [facility]    Housing Stability Vital Sign     Unable to Pay for Housing in the Last Year: No     Homeless in the Last Year: No        FAMILY HISTORY:     History reviewed. No pertinent family history.    MEDICATIONS:       Current Outpatient Medications:     Prenatal Vit-DSS-Fe Fum-FA (PRENATAL 19) Oral Tab, Take 1 tablet by mouth daily., Disp: 90 tablet, Rfl: 1    ALLERGIES:     Allergies[1]      REVIEW OF SYSTEMS:     Constitutional:    denies fever / chills  Cardiovascular:  denies chest pain or palpitations  Respiratory:    denies shortness of breath  Gastrointestinal:  denies severe abdominal pain, frequent diarrhea or constipation, nausea / vomiting  Genitourinary:    denies dysuria, bothersome incontinence  Skin/Breast:   denies any breast pain, lumps, or discharge  Neurological:    denies frequent severe headaches  Psychiatric:   denies depression or anxiety, thoughts of harming self or others      PHYSICAL EXAM:   Blood pressure 99/65, pulse 87, weight 155 lb (70.3 kg), last menstrual period 06/27/2024.  Constitutional:  well developed, well nourished, no distress  Abdomen:   soft, gravid, nontender  Musculoskeletal: no cva tenderness bilaterally  Skin/Hair:  no unusual rashes or bruises  Extremities:  no edema, no cyanosis, non tender bilaterally  Psychiatric:   oriented to time, place, person and situation. Appropriate mood and affect      ASSESSMENT & PLAN:     Mechelle was seen today for prenatal care.    Diagnoses and all orders for this  visit:    Supervision of elderly multigravida in second trimester (HCC)  -     HIV Ag/Ab Combo; Future  -     Glucose Tolerance, 50 gm (1 hr), Gestational (ADA); Future  -     AFP, Maternal Serum; Future  -     [id number] PLUS+SCA; Future  -     Urine Culture, Routine; Future  -     MATERNAL FETAL MEDICINE - INTERNAL    Labs  Level 2 for ama  Bs ultrasound live iup  Asa 2 tabs daily for pih risks factors: ama, obesity      FOLLOW-UP     No follow-ups on file.      Nikolay Gonzalez MD  10/9/2024         [1] No Known Allergies

## 2024-10-09 NOTE — PROGRESS NOTES
Patient had recent Emergency Room visit, calling to offer Primary Care Physician follow-up appointment (discharged 10/08)    Dr Trip Obando  [facility]   [address] [address]   [phone]   Pt declined apt; pt has existing apt w/Dr Carlos OROZCO 10/09 @2:15pm  Closing encounter

## 2024-10-12 ENCOUNTER — LAB ENCOUNTER (OUTPATIENT)
Dept: LAB | Facility: HOSPITAL | Age: 34
End: 2024-10-12
Attending: OBSTETRICS & GYNECOLOGY
Payer: COMMERCIAL

## 2024-10-12 DIAGNOSIS — O09.522 SUPERVISION OF ELDERLY MULTIGRAVIDA IN SECOND TRIMESTER (HCC): ICD-10-CM

## 2024-10-12 LAB — GLUCOSE 1H P GLC SERPL-MCNC: 154 MG/DL

## 2024-10-12 PROCEDURE — 82950 GLUCOSE TEST: CPT

## 2024-10-12 PROCEDURE — 87186 SC STD MICRODIL/AGAR DIL: CPT

## 2024-10-12 PROCEDURE — 87389 HIV-1 AG W/HIV-1&-2 AB AG IA: CPT

## 2024-10-12 PROCEDURE — 82105 ALPHA-FETOPROTEIN SERUM: CPT

## 2024-10-12 PROCEDURE — 36415 COLL VENOUS BLD VENIPUNCTURE: CPT

## 2024-10-12 PROCEDURE — 87088 URINE BACTERIA CULTURE: CPT

## 2024-10-12 PROCEDURE — 87086 URINE CULTURE/COLONY COUNT: CPT

## 2024-10-14 ENCOUNTER — TELEPHONE (OUTPATIENT)
Dept: PERINATAL CARE | Facility: HOSPITAL | Age: 34
End: 2024-10-14

## 2024-10-14 NOTE — TELEPHONE ENCOUNTER
RETURNED PATIENT'S PHONE CALL USING  ID# [id number] REGARDING SCHEDULING. NO ANSWER. LEFT MESSAGE TO PLEASE CALL THE OFFICE [phone].   Mother's informed choice

## 2024-10-15 LAB
AFP MOM: 1.27
AFP VALUE: 37.4 NG/ML
GA ON COLL DATE: 15.3 WEEKS
INSULIN DEP AFP: NO
MAT AGE AT EDD: 35.2 YR
MULTIPLE GEST AFP: NO
OSBR RISK 1 IN AFP: 5251
WEIGHT AFP: 155 LBS

## 2024-10-17 DIAGNOSIS — N30.00 ACUTE CYSTITIS WITHOUT HEMATURIA: Primary | ICD-10-CM

## 2024-10-17 DIAGNOSIS — R73.09 ABNORMAL GTT (GLUCOSE TOLERANCE TEST): ICD-10-CM

## 2024-10-17 LAB
GESTATIONAL AGE > OR = 9W:: YES
MONOSOMY X (TURNER SYNDROME): NOT DETECTED
TEST RESULT: NEGATIVE
TRISOMY 13 (PATAU SYNDROME): NEGATIVE
TRISOMY 18 (EDWARDS SYNDROME): NEGATIVE
TRISOMY 21 (DOWN SYNDROME): NEGATIVE
XXX (TRIPLE X SYNDROME): NOT DETECTED
XXY (KLINEFELTER SYNDROME): NOT DETECTED
XYY (JACOBS SYNDROME): NOT DETECTED

## 2024-10-17 RX ORDER — NITROFURANTOIN 25; 75 MG/1; MG/1
100 CAPSULE ORAL 2 TIMES DAILY
Qty: 14 CAPSULE | Refills: 0 | Status: SHIPPED | OUTPATIENT
Start: 2024-10-17 | End: 2024-10-24

## 2024-10-23 ENCOUNTER — OFFICE VISIT (OUTPATIENT)
Dept: FAMILY MEDICINE CLINIC | Facility: CLINIC | Age: 34
End: 2024-10-23

## 2024-10-23 VITALS
DIASTOLIC BLOOD PRESSURE: 70 MMHG | SYSTOLIC BLOOD PRESSURE: 114 MMHG | HEART RATE: 93 BPM | HEIGHT: 56 IN | BODY MASS INDEX: 34.6 KG/M2 | WEIGHT: 153.81 LBS

## 2024-10-23 DIAGNOSIS — Z3A.18 18 WEEKS GESTATION OF PREGNANCY (HCC): Primary | ICD-10-CM

## 2024-10-23 PROCEDURE — 3074F SYST BP LT 130 MM HG: CPT | Performed by: FAMILY MEDICINE

## 2024-10-23 PROCEDURE — 3078F DIAST BP <80 MM HG: CPT | Performed by: FAMILY MEDICINE

## 2024-10-23 PROCEDURE — 99213 OFFICE O/P EST LOW 20 MIN: CPT | Performed by: FAMILY MEDICINE

## 2024-10-23 PROCEDURE — 3008F BODY MASS INDEX DOCD: CPT | Performed by: FAMILY MEDICINE

## 2024-10-23 RX ORDER — ASPIRIN 81 MG/1
162 TABLET, COATED ORAL DAILY
COMMUNITY
Start: 2024-10-12

## 2024-10-23 NOTE — PROGRESS NOTES
10/23/2024  12:10 PM    Mechelle Wise is a 34 year old female.    Chief complaint(s):   Chief Complaint   Patient presents with    Note     Work note with restrictions.      HPI:     Mechelle Wise primary complaint is regarding pregnancy.     Mechelle Wiseis a 34 year old female is her for possible pregnancy.  Menarche occurred at age of 12 .  Her last normal period was on 06/27/2024.  MIKE 04/04/2025 @ 18 1/2 wks EGA. Previous menstrual pattern is described as  regular in frequency, averaging 3 days in duration.  She is G 5, P 4, Ab 0 (induced), Ab 0 (spontaneous).  She is sexually active and is not using any means of contraception. Significant associated symptoms include nausea, headaches. She has no vaginal bleeding, denies any abdominal-pelvic cramps, has no headaches. Request note for work. She has been to see OB doctor,  2 weeks ago.         HISTORY:  Past Medical History:    Renal stones      No past surgical history on file.   No family history on file.   Social History:   Social History     Socioeconomic History    Marital status: Single   Tobacco Use    Smoking status: Never     Passive exposure: Never    Smokeless tobacco: Never   Vaping Use    Vaping status: Never Used   Substance and Sexual Activity    Alcohol use: Never    Drug use: Never     Social Drivers of Health     Financial Resource Strain: Low Risk  (8/23/2024)    Received from [email or web address]    Overall Financial Resource Strain (CARDIA)     Difficulty of Paying Living Expenses: Not hard at all   Food Insecurity: No Food Insecurity (8/23/2024)    Received from [facility]    Food Insecurity     Within the past 30 days, I worried whether my food would run out before I got money to buy more. / En los últimos 30 días, me preocupó que la comida se podía acabar antes de tener dinero para compr...: Never true / Nunca     Within the past 30 days, the food that I bought just didn't last, and I didn't  have money to get more. / En los últimos 30 días, La comida que compré no rindió lo suficiente, y no tenía dinero para...: Never true / Nunca   Housing Stability: Unknown (8/23/2024)    Received from [facility]    Housing Stability Vital Sign     Unable to Pay for Housing in the Last Year: No     Homeless in the Last Year: No        Immunizations:   Immunization History   Administered Date(s) Administered    TDAP 09/09/2023       Medications (Active prior to today's visit):  Current Outpatient Medications   Medication Sig Dispense Refill    ASPIRIN LOW DOSE 81 MG Oral Tab EC Take 2 tablets (162 mg total) by mouth daily.      Aspirin 81 MG Oral Cap Take 2 tablets by mouth daily. 90 capsule 2    Prenatal Vit-DSS-Fe Fum-FA (PRENATAL 19) Oral Tab Take 1 tablet by mouth daily. 90 tablet 1    nitrofurantoin monohydrate macro (MACROBID) 100 MG Oral Cap Take 1 capsule (100 mg total) by mouth 2 (two) times daily for 7 days. 14 capsule 0    Ferrous Sulfate 325 (65 Fe) MG Oral Tab Take 1 tablet (325 mg total) by mouth every other day. 90 tablet 3       Allergies:  Allergies[1]      ROS:   Review of Systems   Constitutional:  Negative for appetite change and fever.   Eyes:  Negative for visual disturbance.   Respiratory:  Negative for shortness of breath.    Cardiovascular:  Negative for chest pain.   Gastrointestinal:  Negative for abdominal pain, nausea and vomiting.   Genitourinary:  Positive for menstrual problem (pregnant).   Musculoskeletal:  Negative for back pain.   Skin:  Negative for rash.   Neurological:  Negative for dizziness and headaches.       PHYSICAL EXAM:   VS: /70 (BP Location: Right arm, Patient Position: Sitting, Cuff Size: adult)   Pulse 93   Ht 4' 8\" (1.422 m)   Wt 153 lb 12.8 oz (69.8 kg)   LMP 06/27/2024 (Exact Date)   BMI 34.48 kg/m²     Physical Exam  Vitals reviewed.   Constitutional:       General: She is not in acute distress.     Appearance: Normal appearance.    HENT:      Head: Normocephalic.   Eyes:      Conjunctiva/sclera: Conjunctivae normal.   Cardiovascular:      Rate and Rhythm: Normal rate.   Pulmonary:      Effort: Pulmonary effort is normal.   Musculoskeletal:      Cervical back: Neck supple.   Skin:     Findings: No rash.   Psychiatric:         Mood and Affect: Mood normal.         LABORATORY RESULTS:     EKG / Spirometry : -     Radiology: No results found.     ASSESSMENT/PLAN:   Assessment   Encounter Diagnosis   Name Primary?    18 weeks gestation of pregnancy (HCC) Yes       MEDICATIONS:   CPM  REFERRALS: [facility] with OB  RECOMMENDATIONS given include: Patient was reassured of  her medical condition and all questions and concerns were answered. Patient was informed to please, call our office with any new or further questions or concerns that may come up in the near future. Notify Dr Pablo or the [facility] if there is a deterioration or worsening of the medical condition. Also, inform the doctor with any new symptoms or medications' side effects.  Note for work generated    FOLLOW-UP: Schedule a follow-up visit in  prn.            Orders This Visit:  No orders of the defined types were placed in this encounter.      Meds This Visit:  Requested Prescriptions      No prescriptions requested or ordered in this encounter       Imaging & Referrals:  None         LORE PABLO MD         [1] No Known Allergies

## 2024-10-26 ENCOUNTER — LAB ENCOUNTER (OUTPATIENT)
Dept: LAB | Facility: HOSPITAL | Age: 34
End: 2024-10-26
Attending: OBSTETRICS & GYNECOLOGY
Payer: COMMERCIAL

## 2024-10-26 ENCOUNTER — TELEPHONE (OUTPATIENT)
Dept: ENDOCRINOLOGY CLINIC | Facility: CLINIC | Age: 34
End: 2024-10-26

## 2024-10-26 DIAGNOSIS — R73.09 ABNORMAL GTT (GLUCOSE TOLERANCE TEST): ICD-10-CM

## 2024-10-26 LAB
EST. AVERAGE GLUCOSE BLD GHB EST-MCNC: 105 MG/DL (ref 68–126)
GLUCOSE 1H P GLC SERPL-MCNC: 209 MG/DL
GLUCOSE 2H P GLC SERPL-MCNC: 171 MG/DL
GLUCOSE 3H P GLC SERPL-MCNC: 132 MG/DL (ref 70–140)
GLUCOSE P FAST SERPL-MCNC: 85 MG/DL
HBA1C MFR BLD: 5.3 % (ref ?–5.7)

## 2024-10-26 PROCEDURE — 82952 GTT-ADDED SAMPLES: CPT

## 2024-10-26 PROCEDURE — 82951 GLUCOSE TOLERANCE TEST (GTT): CPT

## 2024-10-26 PROCEDURE — 83036 HEMOGLOBIN GLYCOSYLATED A1C: CPT

## 2024-10-26 PROCEDURE — 36415 COLL VENOUS BLD VENIPUNCTURE: CPT

## 2024-10-26 NOTE — TELEPHONE ENCOUNTER
Pt walked in to ask about her medications.  She was given Nitrofurantoin  Mono/Mac 100MG CAPS  2 take 1 capsule twice daily for 7 days.  Aspirin 81 MG EC Low  Dose tablets  Take 2 tablets daily    Pt is getting stomach aches and is worried because Pt is pregnant and is wondering if she should continue taking the medication?    Pt stated that she will stop taking medication until someone calls her back to confirm it is safe.    [phone] and is [place] speaking

## 2024-10-27 DIAGNOSIS — N30.00 ACUTE CYSTITIS WITHOUT HEMATURIA: Primary | ICD-10-CM

## 2024-10-27 RX ORDER — SULFAMETHOXAZOLE AND TRIMETHOPRIM 800; 160 MG/1; MG/1
1 TABLET ORAL 2 TIMES DAILY
Qty: 14 TABLET | Refills: 0 | Status: SHIPPED | OUTPATIENT
Start: 2024-10-27 | End: 2024-11-03

## 2024-10-27 NOTE — TELEPHONE ENCOUNTER
The stomach issue is a side affect of macrobid. Please ask her to stop macrobid,and instead I have prescribed bactrim to her pharmacy, bid x 7 days

## 2024-10-28 ENCOUNTER — TELEPHONE (OUTPATIENT)
Dept: OBGYN CLINIC | Facility: CLINIC | Age: 34
End: 2024-10-28

## 2024-10-28 DIAGNOSIS — O24.419 GESTATIONAL DIABETES MELLITUS (GDM) IN SECOND TRIMESTER, GESTATIONAL DIABETES METHOD OF CONTROL UNSPECIFIED (HCC): Primary | ICD-10-CM

## 2024-10-28 RX ORDER — LANCETS
EACH MISCELLANEOUS
Qty: 200 EACH | Refills: 2 | Status: SHIPPED | OUTPATIENT
Start: 2024-10-28

## 2024-10-28 RX ORDER — BLOOD-GLUCOSE METER
EACH MISCELLANEOUS
Qty: 1 KIT | Refills: 0 | Status: SHIPPED | OUTPATIENT
Start: 2024-10-28

## 2024-10-28 RX ORDER — BLOOD SUGAR DIAGNOSTIC
STRIP MISCELLANEOUS
Qty: 200 STRIP | Refills: 3 | Status: SHIPPED | OUTPATIENT
Start: 2024-10-28

## 2024-10-28 NOTE — TELEPHONE ENCOUNTER
Patient verified name and     Patient informed of results and recommendations. Wishes to call back when she is with spouse so he can get information and instructions as she is not understanding.  Supplies sent and referral placed, patient will call us back this afternoon.

## 2024-10-28 NOTE — TELEPHONE ENCOUNTER
----- Message from Nikolay Gonzalez sent at 10/28/2024 10:03 AM CDT -----  Abnormal 3 hour gtt. Will send to staff to escribe gluconometer, lancets, accustrips , and to refer the patient for diabetic teaching. Please also send a Mychart invitation and ask to enter all blood sugars in mychart. Will have staff ask the patient to do accuchecks four times a day:   Fasting, 2 hours after breakfast, 2 hours after lunch, and 2 hours after dinner and to enter these results in Mychart so the provider can review at each office visit. The staff will make sure she has an office visit scheduled within the next 2 weeks.

## 2024-10-31 NOTE — TELEPHONE ENCOUNTER
Patient verified name and     Patient requesting for us to inform her spouse of recommendations. Spouse informed, states [facility]. Has already picked up supplies. All questions answered.

## 2024-11-06 ENCOUNTER — ROUTINE PRENATAL (OUTPATIENT)
Dept: OBGYN CLINIC | Facility: CLINIC | Age: 34
End: 2024-11-06

## 2024-11-06 VITALS
DIASTOLIC BLOOD PRESSURE: 63 MMHG | SYSTOLIC BLOOD PRESSURE: 98 MMHG | HEART RATE: 77 BPM | BODY MASS INDEX: 35 KG/M2 | WEIGHT: 155 LBS

## 2024-11-06 DIAGNOSIS — O24.410 DIET CONTROLLED GESTATIONAL DIABETES MELLITUS (GDM) IN SECOND TRIMESTER (HCC): Primary | ICD-10-CM

## 2024-11-06 PROCEDURE — 90471 IMMUNIZATION ADMIN: CPT | Performed by: OBSTETRICS & GYNECOLOGY

## 2024-11-06 PROCEDURE — 90686 IIV4 VACC NO PRSV 0.5 ML IM: CPT | Performed by: OBSTETRICS & GYNECOLOGY

## 2024-11-06 PROCEDURE — 3074F SYST BP LT 130 MM HG: CPT | Performed by: OBSTETRICS & GYNECOLOGY

## 2024-11-06 PROCEDURE — 3078F DIAST BP <80 MM HG: CPT | Performed by: OBSTETRICS & GYNECOLOGY

## 2024-11-06 NOTE — PROGRESS NOTES
Mechelle Wise is a 34 year old female  Patient's last menstrual period was 2024 (exact date).   Chief Complaint   Patient presents with    Prenatal Care     Pt here for prenatal visit       OBSTETRICS HISTORY:     OB History    Para Term  AB Living   5 4 4         SAB IAB Ectopic Multiple Live Births                  # Outcome Date GA Lbr Andrew/2nd Weight Sex Type Anes PTL Lv   5 Current            4 Term 10/11/15    M NORMAL SPONT      3 Term 11    M NORMAL SPONT      2 Term 08    M NORMAL SPONT      1 Term 06    F NORMAL SPONT          GYNE HISTORY:         Period Cycle (Days): 28 (10/9/2024  1:35 PM)  Period Duration (Days): 3 (10/9/2024  1:35 PM)  Period Flow: moderate (10/9/2024  1:35 PM)  Use of Birth Control (if yes, specify type): None (10/9/2024  1:35 PM)  Hx Prior Abnormal Pap: No (10/9/2024  1:35 PM)        Latest Ref Rng & Units 2023     3:27 PM   RECENT PAP RESULTS   INTERPRETATION/RESULT: Negative for intraepithelial lesion or malignancy Negative for intraepithelial lesion or malignancy          MEDICAL HISTORY:     Past Medical History:    Renal stones       SURGICAL HISTORY:     History reviewed. No pertinent surgical history.    SOCIAL HISTORY:     Social History     Socioeconomic History    Marital status: Single   Tobacco Use    Smoking status: Never     Passive exposure: Never    Smokeless tobacco: Never   Vaping Use    Vaping status: Never Used   Substance and Sexual Activity    Alcohol use: Never    Drug use: Never     Social Drivers of Health     Financial Resource Strain: Low Risk  (2024)    Received from [facility]    Overall Financial Resource Strain (CARDIA)     Difficulty of Paying Living Expenses: Not hard at all   Food Insecurity: No Food Insecurity (2024)    Received from [facility] [facility]    Food Insecurity     Within the past 30 days, I worried whether my food would run out before I got  money to buy more. / En los últimos 30 días, me preocupó que la comida se podía acabar antes de tener dinero para compr...: Never true / Nunca     Within the past 30 days, the food that I bought just didn't last, and I didn't have money to get more. / En los últimos 30 días, La comida que compré no rindió lo suficiente, y no tenía dinero para...: Never true / Nunca   Housing Stability: Unknown (8/23/2024)    Received from [facility]    Housing Stability Vital Sign     Unable to Pay for Housing in the Last Year: No     Homeless in the Last Year: No        FAMILY HISTORY:     History reviewed. No pertinent family history.    MEDICATIONS:       Current Outpatient Medications:     Blood Glucose Monitoring Suppl (ONETOUCH ULTRA 2) w/Device Does not apply Kit, Fasting, 2 hours after breakfast, 2 hours after lunch, and 2 hours after dinner  Please substitute what is on formulary, Disp: 1 kit, Rfl: 0    Glucose Blood (ONETOUCH ULTRA) In Vitro Strip, Use strip to test blood glucose 4 times a day as directed. Please substitute what is on formulary, Disp: 200 strip, Rfl: 3    OneTouch UltraSoft Lancets Does not apply Misc, Finger stick route 4 (four) times daily.Please substitute what is on formulary, Disp: 200 each, Rfl: 2    ASPIRIN LOW DOSE 81 MG Oral Tab EC, Take 2 tablets (162 mg total) by mouth daily., Disp: , Rfl:     Ferrous Sulfate 325 (65 Fe) MG Oral Tab, Take 1 tablet (325 mg total) by mouth every other day., Disp: 90 tablet, Rfl: 3    Aspirin 81 MG Oral Cap, Take 2 tablets by mouth daily., Disp: 90 capsule, Rfl: 2    Prenatal Vit-DSS-Fe Fum-FA (PRENATAL 19) Oral Tab, Take 1 tablet by mouth daily., Disp: 90 tablet, Rfl: 1    ALLERGIES:     Allergies[1]      REVIEW OF SYSTEMS:     Constitutional:    denies fever / chills  Cardiovascular:  denies chest pain or palpitations  Respiratory:    denies shortness of breath  Gastrointestinal:  denies severe abdominal pain, frequent diarrhea or  constipation, nausea / vomiting  Genitourinary:    denies dysuria, bothersome incontinence  Skin/Breast:   denies any breast pain, lumps, or discharge  Neurological:    denies frequent severe headaches  Psychiatric:   denies depression or anxiety, thoughts of harming self or others      PHYSICAL EXAM:   Blood pressure 98/63, pulse 77, weight 155 lb (70.3 kg), last menstrual period 06/27/2024.  Constitutional:  well developed, well nourished, no distress  Abdomen:   soft, gravid, nontender  Musculoskeletal: no cva tenderness bilaterally  Skin/Hair:  no unusual rashes or bruises  Extremities:  no edema, no cyanosis, non tender bilaterally  Psychiatric:   oriented to time, place, person and situation. Appropriate mood and affect      ASSESSMENT & PLAN:     Mechelle was seen today for prenatal care.    Diagnoses and all orders for this visit:    Diet controlled gestational diabetes mellitus (GDM) in second trimester (HCC)  -     Urine Culture, Routine; Future      Rec: pt has accucheck machine, but does not know how to do it. I rec: appt with diabetic counseling and she agreed to call and schedule it.       FOLLOW-UP     No follow-ups on file.      Nikolay Gonzalez MD  11/6/2024         [1] No Known Allergies

## 2024-11-08 ENCOUNTER — TELEPHONE (OUTPATIENT)
Dept: ENDOCRINOLOGY | Facility: HOSPITAL | Age: 34
End: 2024-11-08

## 2024-11-08 NOTE — TELEPHONE ENCOUNTER
Good Morning!    We have Mechelle scheduled for her GDM appt with us on Monday. However, can the order please be amended to reflect that there is a language limitation. We do see the [place] speaking note, but it does have to be listed in the special needs section of the order.    Please call with any questions.    Thanks!

## 2024-11-11 ENCOUNTER — HOSPITAL ENCOUNTER (OUTPATIENT)
Dept: ENDOCRINOLOGY | Age: 34
End: 2024-11-11
Attending: OBSTETRICS & GYNECOLOGY
Payer: COMMERCIAL

## 2024-11-11 VITALS — BODY MASS INDEX: 35 KG/M2 | WEIGHT: 154.63 LBS

## 2024-11-11 DIAGNOSIS — O24.419 GESTATIONAL DIABETES MELLITUS (GDM) IN SECOND TRIMESTER, GESTATIONAL DIABETES METHOD OF CONTROL UNSPECIFIED (HCC): Primary | ICD-10-CM

## 2024-11-11 NOTE — PROGRESS NOTES
Mechelle Wise  : 1990 was seen for Gestational Diabetes Counseling: Individual/Group    Date: 2024   Start time: 9:50 am  End time: 10:50 am   Patient works 3rd shift and reports having a challenging eating schedule. 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  Obtained usual diet history: 2 meals a day  Meal 1: time 7-8 a glass of milk of banana with pills  Meal 2 : time 3-4 pm rice with greens with tortilla 6-7 tortillas  Meal 3 : time 6 pm rice with beans and a meat  Snack:  carrots, Fruit    Education:     GDM Overview:  Reviewed gestational diabetes as diagnosis including target blood glucose values.  Benefits, risks, and management options for improving/maintaining glucose control to mother/baby discussed.    Instructed /demonstrated ability to perform blood glucose testing on: one touch  ultra meter    Discussed monitoring ketones.    Healthy Eating:  Discussed nutrition concepts for pregnancy/healthy eating and effects of food on BG value.  Timing of meals; what is a carbohydrate, protein, fat.  Taught: Carb counting, label reading, meal planning.  Suggested Calorie Level: 2000    Being Active:  Benefits and effects of activity on BG discussed.     Monitoring:  Instructed on how to use glucose monitor/proper lancet disposal. Testing schedules are:   Fastin-95 mg/dL, Call MD if >95 mg/dL twice in 1 week   2 Hour Post Prandial:  120 md/dL, Call MD if >120 mg/dL twice in 1 week.    Taking Medication:  Reviewed when medication might be indicated.    Reducing Risk:  Effects of elevated blood glucose on mother/baby reviewed.  Discussed management (hyperglycemia, hypoglycemia, sick day, DKA, other) and when to call provider.  Post pregnancy management/prevention of Type 2 DM, and increased risk of having diabetes later in life reviewed.    Healthy Coping:  Family involvement/social support encouraged.  Identification of lifestyle behaviors willing to change  discussed.    Training Tools Provided:  Printed materials covering each topic provided.  Support Plan provided and reviewed.    Patient Chosen Goals:      1. Follow recommended GDM meal plan.   2. Begin testing fasting glucose and 2 hours after meals   3. Please send records to your current ob/gyn or midwife within 3 days - 1 week following this visit                4. Encouraged activity if no restrictions.   5. Encouraged Mechelle Sloan Frandy to call diabetes center with any questions or concerns.    Patient verbalized understanding and has no further questions at this time.    Jyothi Linares RN

## 2024-11-13 ENCOUNTER — ROUTINE PRENATAL (OUTPATIENT)
Dept: OBGYN CLINIC | Facility: CLINIC | Age: 34
End: 2024-11-13

## 2024-11-13 ENCOUNTER — LAB ENCOUNTER (OUTPATIENT)
Dept: LAB | Facility: HOSPITAL | Age: 34
End: 2024-11-13
Attending: OBSTETRICS & GYNECOLOGY
Payer: COMMERCIAL

## 2024-11-13 VITALS
HEART RATE: 86 BPM | BODY MASS INDEX: 35 KG/M2 | SYSTOLIC BLOOD PRESSURE: 111 MMHG | DIASTOLIC BLOOD PRESSURE: 74 MMHG | WEIGHT: 155 LBS

## 2024-11-13 DIAGNOSIS — O24.410 DIET CONTROLLED GESTATIONAL DIABETES MELLITUS (GDM) IN SECOND TRIMESTER (HCC): Primary | ICD-10-CM

## 2024-11-13 DIAGNOSIS — O24.410 DIET CONTROLLED GESTATIONAL DIABETES MELLITUS (GDM) IN SECOND TRIMESTER (HCC): ICD-10-CM

## 2024-11-13 LAB
ANTIBODY SCREEN: NEGATIVE
RH BLOOD TYPE: POSITIVE

## 2024-11-13 PROCEDURE — 3078F DIAST BP <80 MM HG: CPT | Performed by: OBSTETRICS & GYNECOLOGY

## 2024-11-13 PROCEDURE — 86850 RBC ANTIBODY SCREEN: CPT | Performed by: OBSTETRICS & GYNECOLOGY

## 2024-11-13 PROCEDURE — 36415 COLL VENOUS BLD VENIPUNCTURE: CPT | Performed by: OBSTETRICS & GYNECOLOGY

## 2024-11-13 PROCEDURE — 3074F SYST BP LT 130 MM HG: CPT | Performed by: OBSTETRICS & GYNECOLOGY

## 2024-11-13 PROCEDURE — 86900 BLOOD TYPING SEROLOGIC ABO: CPT | Performed by: OBSTETRICS & GYNECOLOGY

## 2024-11-13 PROCEDURE — 87086 URINE CULTURE/COLONY COUNT: CPT

## 2024-11-13 PROCEDURE — 86901 BLOOD TYPING SEROLOGIC RH(D): CPT | Performed by: OBSTETRICS & GYNECOLOGY

## 2024-11-13 NOTE — PROGRESS NOTES
.Mechelle Wise is a 34 year old female  Patient's last menstrual period was 2024 (exact date).   Chief Complaint   Patient presents with    Prenatal Care     Pt here for prenatal visit. Pt had diabetic teaching glucose machine was not working went to pharmacy yesterday to replace.       OBSTETRICS HISTORY:     OB History    Para Term  AB Living   5 4 4         SAB IAB Ectopic Multiple Live Births                  # Outcome Date GA Lbr Andrew/2nd Weight Sex Type Anes PTL Lv   5 Current            4 Term 10/11/15    M NORMAL SPONT      3 Term 11    M NORMAL SPONT      2 Term 08    M NORMAL SPONT      1 Term 06    F NORMAL SPONT          GYNE HISTORY:         Period Cycle (Days): 28 (10/9/2024  1:35 PM)  Period Duration (Days): 3 (10/9/2024  1:35 PM)  Period Flow: moderate (10/9/2024  1:35 PM)  Use of Birth Control (if yes, specify type): None (10/9/2024  1:35 PM)  Hx Prior Abnormal Pap: No (10/9/2024  1:35 PM)        Latest Ref Rng & Units 2023     3:27 PM   RECENT PAP RESULTS   INTERPRETATION/RESULT: Negative for intraepithelial lesion or malignancy Negative for intraepithelial lesion or malignancy          MEDICAL HISTORY:     Past Medical History:    Renal stones       SURGICAL HISTORY:     History reviewed. No pertinent surgical history.    SOCIAL HISTORY:     Social History     Socioeconomic History    Marital status: Single   Tobacco Use    Smoking status: Never     Passive exposure: Never    Smokeless tobacco: Never   Vaping Use    Vaping status: Never Used   Substance and Sexual Activity    Alcohol use: Never    Drug use: Never     Social Drivers of Health     Financial Resource Strain: Low Risk  (2024)    Received from [facility]    Overall Financial Resource Strain (CARDIA)     Difficulty of Paying Living Expenses: Not hard at all   Food Insecurity: No Food Insecurity (2024)    Received from [facility]     Food Insecurity     Within the past 30 days, I worried whether my food would run out before I got money to buy more. / En los últimos 30 días, me preocupó que la comida se podía acabar antes de tener dinero para compr...: Never true / Nunca     Within the past 30 days, the food that I bought just didn't last, and I didn't have money to get more. / En los últimos 30 días, La comida que compré no rindió lo suficiente, y no tenía dinero para...: Never true / Nunca   Housing Stability: Unknown (8/23/2024)    Received from [facility]    Housing Stability Vital Sign     Unable to Pay for Housing in the Last Year: No     Homeless in the Last Year: No        FAMILY HISTORY:     History reviewed. No pertinent family history.    MEDICATIONS:       Current Outpatient Medications:     Blood Glucose Monitoring Suppl (ONETOUCH ULTRA 2) w/Device Does not apply Kit, Fasting, 2 hours after breakfast, 2 hours after lunch, and 2 hours after dinner  Please substitute what is on formulary, Disp: 1 kit, Rfl: 0    Glucose Blood (ONETOUCH ULTRA) In Vitro Strip, Use strip to test blood glucose 4 times a day as directed. Please substitute what is on formulary, Disp: 200 strip, Rfl: 3    OneTouch UltraSoft Lancets Does not apply Misc, Finger stick route 4 (four) times daily.Please substitute what is on formulary, Disp: 200 each, Rfl: 2    ASPIRIN LOW DOSE 81 MG Oral Tab EC, Take 2 tablets (162 mg total) by mouth daily., Disp: , Rfl:     Ferrous Sulfate 325 (65 Fe) MG Oral Tab, Take 1 tablet (325 mg total) by mouth every other day., Disp: 90 tablet, Rfl: 3    Aspirin 81 MG Oral Cap, Take 2 tablets by mouth daily., Disp: 90 capsule, Rfl: 2    Prenatal Vit-DSS-Fe Fum-FA (PRENATAL 19) Oral Tab, Take 1 tablet by mouth daily., Disp: 90 tablet, Rfl: 1    ALLERGIES:     Allergies[1]      REVIEW OF SYSTEMS:     Constitutional:    denies fever / chills  Cardiovascular:  denies chest pain or palpitations  Respiratory:    denies  shortness of breath  Gastrointestinal:  denies severe abdominal pain, frequent diarrhea or constipation, nausea / vomiting  Genitourinary:    denies dysuria, bothersome incontinence  Skin/Breast:   denies any breast pain, lumps, or discharge  Neurological:    denies frequent severe headaches  Psychiatric:   denies depression or anxiety, thoughts of harming self or others      PHYSICAL EXAM:   Blood pressure 111/74, pulse 86, weight 155 lb (70.3 kg), last menstrual period 06/27/2024.  Constitutional:  well developed, well nourished, no distress  Abdomen:   soft, gravid, nontender  Musculoskeletal: no cva tenderness bilaterally  Skin/Hair:  no unusual rashes or bruises  Extremities:  no edema, no cyanosis, non tender bilaterally  Psychiatric:   oriented to time, place, person and situation. Appropriate mood and affect      ASSESSMENT & PLAN:     Mechelle was seen today for prenatal care.    Diagnoses and all orders for this visit:    Diet controlled gestational diabetes mellitus (GDM) in second trimester (HCC)  -     Type and screen      Type and screen today  Pt will get gluconometer today and start accuchecks today. S/p diabetic teaching    FOLLOW-UP     No follow-ups on file.      Nikolay Gonzalez MD  11/13/2024         [1] No Known Allergies

## 2024-11-14 ENCOUNTER — HOSPITAL ENCOUNTER (OUTPATIENT)
Dept: PERINATAL CARE | Facility: HOSPITAL | Age: 34
Discharge: HOME | End: 2024-11-14
Attending: OBSTETRICS & GYNECOLOGY
Payer: COMMERCIAL

## 2024-11-14 ENCOUNTER — TELEPHONE (OUTPATIENT)
Dept: PERINATAL CARE | Facility: HOSPITAL | Age: 34
End: 2024-11-14

## 2024-11-14 VITALS
SYSTOLIC BLOOD PRESSURE: 91 MMHG | DIASTOLIC BLOOD PRESSURE: 50 MMHG | HEART RATE: 85 BPM | WEIGHT: 155 LBS | BODY MASS INDEX: 35 KG/M2

## 2024-11-14 DIAGNOSIS — O09.522 SUPERVISION OF ELDERLY MULTIGRAVIDA IN SECOND TRIMESTER (HCC): ICD-10-CM

## 2024-11-14 DIAGNOSIS — E66.811 OBESITY (BMI 30.0-34.9): ICD-10-CM

## 2024-11-14 DIAGNOSIS — Z36.89 ENCOUNTER FOR FETAL ANATOMIC SURVEY (HCC): Primary | ICD-10-CM

## 2024-11-14 DIAGNOSIS — O24.419 GESTATIONAL DIABETES (HCC): ICD-10-CM

## 2024-11-14 DIAGNOSIS — O24.410 DIET CONTROLLED GESTATIONAL DIABETES MELLITUS (GDM) IN SECOND TRIMESTER (HCC): ICD-10-CM

## 2024-11-14 DIAGNOSIS — Z36.89 ENCOUNTER FOR FETAL ANATOMIC SURVEY (HCC): ICD-10-CM

## 2024-11-14 PROCEDURE — 76811 OB US DETAILED SNGL FETUS: CPT | Performed by: OBSTETRICS & GYNECOLOGY

## 2024-11-14 NOTE — TELEPHONE ENCOUNTER
Spoke with pt via   [facility] #[id number] regarding [employer] [facility] blood sugar log. Pt is unable to understand how to use the log. Pt states she is going to ask a friend to help her this weekend. Pt understands to check blood sugars 4x's/day. Requested pt check back on Monday if she is still having trouble. Pt states she will. Confirmed with pt that [facility] will review sugars next Thursday,

## 2024-11-14 NOTE — PROGRESS NOTES
Outpatient Maternal-Fetal Medicine Consultation    Dear Dr. Gonzalez,    Thank you for requesting ultrasound evaluation and maternal fetal medicine consultation on your patient Mechelle Wise.  As you are aware she is a 34 year old female with a Kaur pregnancy at 20w0d.  A maternal-fetal medicine consultation was requested secondary to AMA, obesity and second trimester diagnosis of GDM.  Her prenatal records and labs were reviewed.    A1c is 5.3%  Her youngest son was about 10 pounds per her report.  She was not tested for diabetes in that pregnancy.  All of her children were born in [facility].  HISTORY  OB History    Para Term  AB Living   5 4 4 0 0 0   SAB IAB Ectopic Multiple Live Births   0 0 0 0 0     # 1 - Date: 06, Sex: Female, Weight: None, GA: None, Type: Normal spontaneous vaginal delivery, Apgar1: None, Apgar5: None, Living: None, Birth Comments: None    # 2 - Date: 08, Sex: Male, Weight: None, GA: None, Type: Normal spontaneous vaginal delivery, Apgar1: None, Apgar5: None, Living: None, Birth Comments: None    # 3 - Date: 11, Sex: Male, Weight: None, GA: None, Type: Normal spontaneous vaginal delivery, Apgar1: None, Apgar5: None, Living: None, Birth Comments: None    # 4 - Date: 10/11/15, Sex: Male, Weight: None, GA: None, Type: Normal spontaneous vaginal delivery, Apgar1: None, Apgar5: None, Living: None, Birth Comments: None    # 5 - Date: None, Sex: None, Weight: None, GA: None, Type: None, Apgar1: None, Apgar5: None, Living: None, Birth Comments: None    Past Medical History  The patient  has a past medical history of BMI 31.0-31.9,adult and Renal stones.    Past Surgical History  The patient  has no past surgical history on file.    Family History  The patient has no family status information on file.       Medications:   Current Outpatient Medications:     Blood Glucose Monitoring Suppl (ONETOUCH ULTRA 2) w/Device Does not apply Kit, Fasting, 2 hours after  breakfast, 2 hours after lunch, and 2 hours after dinner  Please substitute what is on formulary, Disp: 1 kit, Rfl: 0    Glucose Blood (ONETOUCH ULTRA) In Vitro Strip, Use strip to test blood glucose 4 times a day as directed. Please substitute what is on formulary, Disp: 200 strip, Rfl: 3    OneTouch UltraSoft Lancets Does not apply Misc, Finger stick route 4 (four) times daily.Please substitute what is on formulary, Disp: 200 each, Rfl: 2    ASPIRIN LOW DOSE 81 MG Oral Tab EC, Take 2 tablets (162 mg total) by mouth daily., Disp: , Rfl:     Ferrous Sulfate 325 (65 Fe) MG Oral Tab, Take 1 tablet (325 mg total) by mouth every other day., Disp: 90 tablet, Rfl: 3    Aspirin 81 MG Oral Cap, Take 2 tablets by mouth daily., Disp: 90 capsule, Rfl: 2    Prenatal Vit-DSS-Fe Fum-FA (PRENATAL 19) Oral Tab, Take 1 tablet by mouth daily., Disp: 90 tablet, Rfl: 1  Allergies: Allergies[1]      PHYSICAL EXAMINATION:  BP 91/50   Pulse 85   Wt 155 lb (70.3 kg)   LMP 06/27/2024 (Exact Date)   BMI 34.75 kg/m²   General: alert and oriented,no acute distress  Abdomen: gravid, soft, non-tender  Extremities: non-tender, no edema      OBSTETRIC ULTRASOUND  The patient had a level 2 ultrasound today which I interpreted the results and reviewed them with the patient.    Ultrasound Findings:  Single IUP in breech presentation.    Placenta is posterior.   A 3 vessel cord is noted.  Cardiac activity is present at 142 bpm   g ( 0 lb 14 oz);   MVP is 5.1 cm .     The fetal measurements are consistent with the established EDC. No ultrasound evidence of structural abnormalities are seen today. The nasal bone is present. No ultrasound evidence of markers for aneuploidy are seen. She understands that ultrasound exam cannot exclude genetic abnormalities and that genetic testing is recommended. The limitations of ultrasound were discussed.     Uterus and adnexa appeared normal  today on US    See imaging tab for the complete US  report.    DISCUSSION  During her visit we discussed and reviewed the following issues:  OBESITY:  Her BMI prior to pregnancy was 31.5  Obesity during pregnancy is associated with numerous maternal and  risks.  It is not clear whether obesity is a direct cause of adverse pregnancy outcome or whether the association between obesity and adverse pregnancy outcome is due to factors such as diabetes mellitus.   Data suggest that obese women should be encouraged to undertake a weight reduction program (diet, exercise, behavior modification, and possibly bariatric surgery in some cases) prior to attempting to conceive.            Subfertility in obese women is most commonly related to ovulatory dysfunction, and, in some obese women, the ovulatory dysfunction is related to polycystic ovary syndrome (PCOS). It is also important to note that even among ovulatory women, increasing obesity is associated with decreasing spontaneous pregnancy rates.  The increased risk of miscarriage in obese women may be because such women often have PCOS or isolated insulin resistance.                An association between obesity and hypertensive disorders during pregnancy has been consistently reported.  In particular, maternal weight and BMI are independent risk factors for preeclampsia.             Studies have found that the increased risk of  birth in obese gravidas is primarily associated with obesity-related medical and  complications, rather than an intrinsic predisposition to spontaneous  birth. Prevention of  birth in these patients, therefore, should be directed toward prevention or management of medical and obstetrical complications.               Both prepregnancy obesity and excessive maternal weight gain before or during pregnancy contribute to an increased probability of  delivery.  It has also been hypothesized that obesity may lead to dystocia due to increased soft tissue  deposition in the maternal pelvis.    delivery in the obese  is associated with numerous perioperative concerns, including emergency delivery, prolonged incision to delivery interval, blood loss >1000 mL, longer operative times, wound infection, thromboembolism, and endometritis.            Maternal obesity appears to be associated with a small increase in the absolute rate of some congenital anomalies (primarily neural tube defect and cardiac), and the risk may increase with increasing maternal weight.  Level II ultrasound is advised for women with obesity.  The risk of neural tube defects increased significantly with maternal weight.    The analysis found that overweight and obese pregnant women experienced significantly more stillbirths than normal weight women.  Third trimester  testing is advised.    We discussed the current recommendations for limited gestational weight gain in pregnancy for overweight and obese women.  The [employer] currently recommends that women keep gestational weight gain to between 8-18 lbs.  We discussed the role of mild to moderate exercise, healthy food choices and appropriate portions sized to help achieve this goal.  Excess weight gain is associated with higher rates of gestational diabetes, hypertensive complications, fetal macrosomia and delivery complications.  Women with weight loss or insufficient weight gain have higher rates of small for gestational age infants.    A recent study found that initiating moderate exercise in early pregnancy for obese gravidas significantly reduced the incidence of gestational diabetes, gestational hypertension,  deliveries and C-sections.I encouraged Vicky to begin moderate exercise such as walking or stationary bike in the pregnancy.    ADVANCED MATERNAL AGE    Background  I reviewed with the patient that pregnancies in women of advanced maternal age (35 or older at delivery) are associated with elevated  risks. Specifically, there is a higher rate of:  Fetal malformations  Preeclampsia  Gestational diabetes  Intrauterine fetal death    As a result, enhanced pregnancy surveillance is advised for these patients including a comprehensive ultrasound to assess for fetal malformations (at 20 weeks) and a third trimester ultrasound assessment for fetal growth (at 32 weeks). In addition, weekly NST's (initiating at 36 weeks gestation for women 35-39 years and at 32 weeks gestation for women 40 years and older) are also advised. Routine obstetric care is more than adequate to assess for gestational diabetes and preeclampsia; hence, no further significant alterations in obstetric care are advised.    Medical Complications    Women 35 years of age or older can expect to experience two to three fold higher rates of hospitalization,  delivery, and pregnancy-related complications when compared to their younger counterparts.  The two most common medical problems complicating these  pregnanccies are hypertension and diabetes.   The incidence of preeclampsia in the general obstetric population is 3 to 4 percent; this increases to 5 to 10 percent in women over age 40 and is as high as 35 percent in women over age 50.   The incidence of gestational diabetes in the general obstetric population is 3 percent, rising to 7 to 12 percent in women over age 40 and 20 percent in women over age 50.  Women 35 years of age or older are more likely to be delivered by . The  delivery rate in the general obstetric population of the [place] is almost 30 percent, compared to almost 50 percent in women over age 40 to 45 and almost 80 percent in women age 50 to 63.          Fetal Death        A decision analysis tool using data from the [place] [employer] predicted a strategy of weekly antepartum testing and labor induction would lower the risk of unexplained fetal death in women 35 years of age or older.  In this model, weekly testing starting at 36 weeks of gestation would drop the risk of fetal death from 5.2 to 1.3 per 1000 pregnancies. While a policy of antepartum testing in older women does increase the chance that a women will be induced (71 inductions per fetal death averted) and thereby increases her risk of having a  delivery, only 14 additional cesareans would need to be performed to avert one unexplained fetal death.  Hence, weekly NST's are advised for women of advanced maternal age; testing should be initiated at 36 weeks for women 35-39 years and at 32 weeks for women 40 years and older.    Fetal Malformations    Cardiac malformations, clubfoot, and diaphragmatic hernia appear to occur with increased frequency in offspring of older women. These abnormalities are structural and unrelated to aneuploidy, thus they would not be detected by karyotype analysis.  For these reasons a complete, detailed ultrasound (level II) is advised even if the fetus has a normal karyotype.      Fetal Aneuploidy  We also discussed the increased risk of chromosomal abnormalities associated with advanced maternal age. I reviewed that an ultrasound examination cannot reliably exclude potential genetic abnormalities. Given that the patient will be 35 years old at the time of delivery I reviewed that her risk (at amniocentesis) of having a fetus with any chromosome abnormality is 1:140 and with trisomy 21 is 1: 250.    Invasive Testing  I offered invasive genetic testing (amniocentesis, chorionic villus sampling) after reviewing the diagnostic accuracy of these tests as well as the procedure associated loss rate (1:500 for genetic amniocentesis).    She ultimately does not desire invasive genetic testing.     Non-invasive Pregnancy Testing (NIPT) -we reviewed her low risk maternity T21 screen and the role of the level 2 ultrasound.  We discussed her low risk cell free DNA screen and her normal level II ultrasound today.   We discussed her option for amniocentesis but that the likelihood of finding a genetic concern is quite low after her other low risk evaluations.  She appropriately declined invasive prenatal genetic diagnostic testing.    GESTATIONAL DIABETES    3 hour GTT  Lab Results   Component Value Date    GLUFG 85 10/26/2024    GLU1G 209 (H) 10/26/2024    GLU2G 171 (H) 10/26/2024    GLU3G 132 10/26/2024       The patient was informed of the potential implications and risks associated with GDM to her and her fetus, especially when poorly controlled. We discussed the increased incidence of macrosomia and the potential for related birth injury to her and her baby. We talked about the increase risks associated risk of fetal hyperinsulinemia, jaundice, electrolyte imbalance, seizure activity, IUFD and other adverse obstetric outcomes. We also reviewed her  increased risk of having diabetes later in life. The importance of good glycemic control and avoidance of prolonged hypoglycemia and hyperglycemia was addressed.    She was instructed on the diabetic diet at the diabetes education center on November 11, 2024; her diet was modified to provide three meals and three snacks with fewer carbohydrates.  She received instruction on self-monitoring of blood glucose.  She was advised to assess  her blood sugars four times daily (fasting and 2 hour postprandially).   Fasting blood glucose should be less than 95 mg/dl and two hour postprandial values should be less than 120 mg/dl.       Her capillary blood glucose records were not reviewed today; her compliance with the recommended four times daily assessments (fasting and two-hour post-prandial) is poor, she has not yet started.      The patient is presently on diet therapy; her compliance with her diabetic diet regimen was reviewed and it is fair.     We compared and contrasted insulin (long and short acting) and metformin to manage blood sugars in pregnancy.  We discussed insulin as the  gold standard therapy in pregnancy and that it does not cross to the fetal circulation.  Metformin is increasingly being used in pregnancy but the long term data on / childhood effects are lacking.  Metformin crosses the placenta and  levels are >70% of the maternal level at delivery.  Metformin is not associated with increased rates for NICU admission,  hypoglycemia or LGA when its use controls the blood sugars to the recommended targets.  There is emerging information,however, that there is an increase in childhood obesity, and possibly metabolic syndrome in children exposed to Metformin throughout the entire pregnancy.  Currently, use in the third trimester for management of gestational diabetes is within the standard of care.     Based on the above discussion, should the need for improved glycemic control arise, Mechelle would prefer to start on insulin.      Medical Regimen Recommendation:   Continue ADA diet & BS monitoring  She was instructed to send her blood sugars to [facility] weekly for review.    Our office will send her a link to the track my health dia for [employer].  Jannet is going to call the patient later to confirm that she knows how to use the dia.    We discussed the recommended plan of care based on her  risk factors.  Mechelle had her questions answered to her satisfaction.   ID# [id number]      IMPRESSION:  IUP at 20w0d  Normal level 2 ultrasound  Advanced maternal age, low risk cell free DNA screen.  Testing appropriately declined  Early diagnosis of gestational diabetes  Class I obesity complicating pregnancy    RECOMMENDATIONS:  Continue care with Dr. Gonzalez  Check glucoses 4x/day and send to [facility] weekly for review  Monthly growth US with addition of a BPP with each growth assessment at or beyond 32 weeks  Weekly NST at 36 weeks however if she requires medication for diabetes her testing should be increased to the following:  weekly NST at  32 wks / twice weekly at  34   wks      Hgb A1C q trimester  Baseline urine P/C ratio   EKG     Aspirin 81-120mg daily   Delivery for well controlled pre-existing diabetes without other complication 39-39w6d  Delivery for poorly controlled pre-existing diabetes with other complications 36-38w6d.    Total time spent 60 minutes this calendar day which includes preparing to see the patient including chart review, obtaining and/or reviewing additional medical history, performing a physical exam and evaluation, documenting clinical information in the electronic medical record, independently interpreting results, counseling the patient, communicating results to the patient/family/caregiver and coordinating care.     Case discussed with patient who demonstrated understanding and agreement with plan.     Thank you for allowing me to participate in the care of this patient.  Please feel free to contact me with any questions.    Swapna Brown MD  Maternal-Fetal Medicine       Note to patient and family:  The 21st Century Cures Act makes medical notes available to patients in the interest of transparency.  However, please be advised that this is a medical document.  It is intended as a peer to peer communication.  It is written in medical language and may contain abbreviations or verbiage that are technical and unfamiliar.  It may appear blunt or direct.  Medical documents are intended to carry relevant information, facts as evident, and the clinical opinion of the practitioner.         [1] No Known Allergies

## 2024-11-14 NOTE — PROGRESS NOTES
[facility] Progress Note      Patient Name: Claudia Arnold  : 1945  MRN: [id number]  Primary Care Physician:  Robert Cortez MD  Date of admission: 2022    Subjective     Interval History:   The patient was seen and examined today for follow-up on ESRD   Has been complaining of some urgency frequency urination.  Denies any dysuria.  Concerned about UTI.  Up for dialysis today.  Heart rate is controlled.  Denies any chest pain or shortness of breath.  Review of Systems:   As noted above    Objective     Vitals:   Temp:  [97.4 °F (36.3 °C)-98 °F (36.7 °C)] 97.4 °F (36.3 °C)  Heart Rate:  [] 88  Resp:  [18-20] 18  BP: ()/(67-84) 101/72    Intake/Output Summary (Last 24 hours) at 2022 1152  Last data filed at 2022 0900  Gross per 24 hour   Intake 890 ml   Output --   Net 890 ml       Physical Exam:    General Appearance: alert, oriented x 3, no acute distress   Skin: warm and dry  HEENT: oral mucosa normal, nonicteric sclera  Neck: supple, no JVD  Lungs: CTA  Heart: Irregular rhythm, normal S1 and S2  Abdomen: soft, nontender, nondistended  : no palpable bladder  Extremities: Trace edema, cyanosis or clubbing  Neuro: normal speech and mental status     Scheduled Meds:     acetaminophen, 650 mg, Oral, TID  busPIRone, 10 mg, Oral, BID  dilTIAZem, 30 mg, Oral, Q8H  escitalopram, 10 mg, Oral, Daily  lactobacillus acidophilus, 1 capsule, Oral, QAM  metoprolol tartrate, 25 mg, Oral, Q12H  midodrine, 10 mg, Oral, TID AC  pantoprazole, 40 mg, Oral, Q AM  rOPINIRole, 0.25 mg, Oral, Nightly  sodium chloride, 10 mL, Intravenous, Q12H  warfarin, 2 mg, Oral, Once      IV Meds:   Pharmacy to dose warfarin,         Results Reviewed:   I have personally reviewed the results from the time of this admission to 2022 11:52 EDT     Results from last 7 days   Lab Units 22  0440 09/15/22  0535 22  0451 22  1444   SODIUM mmol/L 139 144 141 141   POTASSIUM  Pt for level 2 US  Denies pregnancy complaints  States active fetus   [employer] blood glucose dia sent to pt. This RN will call pt later today to make sure she is able to access the dia and understand how to use it   mmol/L 4.0 4.1 4.2 3.9   CHLORIDE mmol/L 100 104 96* 95*   CO2 mmol/L 24.7 25.0 26.5 23.4   BUN mg/dL 41* 27* 52* 45*   CREATININE mg/dL 4.38* 3.44* 4.66* 4.03*   CALCIUM mg/dL 8.9 9.1 9.6 10.1   BILIRUBIN mg/dL  --   --  0.7 0.8   ALK PHOS U/L  --   --  71 87   ALT (SGPT) U/L  --   --  102* 138*   AST (SGOT) U/L  --   --  53* 84*   GLUCOSE mg/dL 108* 104* 99 110*       Estimated Creatinine Clearance: 11.9 mL/min (A) (by C-G formula based on SCr of 4.38 mg/dL (H)).                Results from last 7 days   Lab Units 09/16/22  0440 09/15/22  0535 09/14/22  0451 09/13/22  1444   WBC 10*3/mm3 7.69 7.34 8.14 9.20   HEMOGLOBIN g/dL 11.9* 12.0 12.1 13.6   PLATELETS 10*3/mm3 140 135* 157 196       Results from last 7 days   Lab Units 09/16/22  0440 09/15/22  0535 09/14/22  0451 09/13/22  1444   INR  3.05* 3.17* 2.96* 2.96*       Assessment / Plan     ASSESSMENT:    1. End-stage renal disease on maintenance hemodialysis on Monday Wednesday Friday.    2. History of hypertension blood pressure is soft today on midodrine per cardiology  3. A. fib with RVR.  Cardiology consulted.  Contemplating cardioversion versus AV node ablation and pacemaker implantation.  Heart rate is better today  4. Diastolic heart failure  5. Volume overload  6. Hyperphosphatemia: Not on binders  7. High anion gap metabolic acidosis.  Likely secondary to chronic kidney disease.  resolved   8. Bioprosthetic mitral and aortic valves on anticoagulation      PLAN:  · Will dialyze today per schedule and  attempt UF as tolerated.    · Continue surveillance labs         Thank you for involving us in the care of Claudia Arnold.  Please feel free to call with any questions.    Chas Spicer MD  09/16/22  11:52 EDT    [facility]  [phone]      Much of this encounter note is an electronic transcription/translation of spoken language to printed text. The electronic translation of spoken language may permit erroneous, or at times,  nonsensical words or phrases to be inadvertently transcribed; Although I have reviewed the note for such errors, some may still exist.

## 2024-11-25 ENCOUNTER — HOSPITAL ENCOUNTER (OUTPATIENT)
Dept: ENDOCRINOLOGY | Age: 34
End: 2024-11-25
Attending: OBSTETRICS & GYNECOLOGY
Payer: COMMERCIAL

## 2024-11-25 VITALS — BODY MASS INDEX: 35 KG/M2 | WEIGHT: 156.19 LBS

## 2024-11-25 DIAGNOSIS — O24.419 GESTATIONAL DIABETES MELLITUS (GDM) IN SECOND TRIMESTER, GESTATIONAL DIABETES METHOD OF CONTROL UNSPECIFIED (HCC): Primary | ICD-10-CM

## 2024-11-25 NOTE — PROGRESS NOTES
Mechelle Wise  : 1990 was seen for GDM  Individual Follow-Up Counseling    Date: 2024  Referring Provider: Dr. Gonzalez  Start time: 9:45 am  End time: 10:15 am    Assessment: Wt 156 lb 3.2 oz   LMP 2024 (Exact Date)   BMI 35.02 kg/m²   Weight:   Wt Readings from Last 6 Encounters:   24 156 lb 3.2 oz   24 155 lb   24 155 lb   24 154 lb 9.6 oz   24 155 lb   10/23/24 153 lb 12.8 oz       Blood Glucose: FB-110.(2 of 8 readings out of target, 6 readings borderline 94-95)   PP: B:  ( 4 of 7 readings out of target)  L: ; ( 1 of 7 readings out of target)   D: 43910 ( 1 of 7 out of target).   Patient may benefit from the start of Insulin. Discussed the possibility of insulin start with patient. Instructed patient to follow up with OB as schedule and bring her glucose log. Patient voiced understanding.      Gestational Diabetes goals assessed by patient: 2 - occasional, barriers to attaining goals discussed and additional modifications to goals made    Diet:   No food log available for review. The following assessment is based on the patient's verbal report.   Following meal plan: Yes/No: Yes  Skips: PM Snack  Meals are Balanced.  Carb Intake is adequate.  Protein Intake is adequate.  Food Selections are healthy.    Exercise:     walks for 10-20 min  2-3 times a week.    Education:     GDM Overview:  Reviewed target blood glucose values for GDM.  Discussed benefits/risks to mother/baby management options to improve/maintain glucose control.     Healthy Eating:  Reviewed/Reinforced:  Nutrition concepts for pregnancy/healthy eating and effect of food on blood glucose.  Meal planning process and benefits of pre-planning meals/snacks.  Appropriate timing of meals/snacks.  Carb counting.  Additional concepts: Increasing fiber, Controlling sodium, and Reducing fat    Being Active:  Reviewed benefits of effects of activity on BG values.  Reviewed types of  activity, duration, precautions.    Monitoring:  Instructed to report readings to MD as directed.  Call MD: if FBG is > 95 twice in 1 week.     If 2 hr PP is > 120 twice in 1 week at any one meal.  Call Diabetic Educator is ketones are consistently elevated.    Taking Medication:  Reviewed appropriate timing (if on insulin) of meds.   Reviewed probability of needing medication adjustments throughout pregnancy.     Reducing Risk:  Discussed management of (hyperglycemia, hypoglycemia) and when to call provider.    Recommendations:      1. Follow recommended meal plan.   2. Test blood glucose and ketones as directed.    3. Please send blood glucose records to your current ob/gyn or midwife within 3 days - 1      week following this visit or as requested by your provider.   4. Start/continue activity if no restrictions.    5. Additional recommendations: drink 48-64 oz of water daily.    Patient verbalized understanding and has no further questions at this time.    Jyothi Linares RN

## 2024-11-27 ENCOUNTER — ROUTINE PRENATAL (OUTPATIENT)
Dept: OBGYN CLINIC | Facility: CLINIC | Age: 34
End: 2024-11-27

## 2024-11-27 ENCOUNTER — TELEPHONE (OUTPATIENT)
Dept: PERINATAL CARE | Facility: HOSPITAL | Age: 34
End: 2024-11-27

## 2024-11-27 VITALS
BODY MASS INDEX: 34 KG/M2 | DIASTOLIC BLOOD PRESSURE: 69 MMHG | SYSTOLIC BLOOD PRESSURE: 102 MMHG | WEIGHT: 153 LBS | HEART RATE: 92 BPM

## 2024-11-27 DIAGNOSIS — O24.410 DIET CONTROLLED GESTATIONAL DIABETES MELLITUS (GDM) IN SECOND TRIMESTER (HCC): Primary | ICD-10-CM

## 2024-11-27 PROCEDURE — 3074F SYST BP LT 130 MM HG: CPT | Performed by: OBSTETRICS & GYNECOLOGY

## 2024-11-27 PROCEDURE — 3078F DIAST BP <80 MM HG: CPT | Performed by: OBSTETRICS & GYNECOLOGY

## 2024-11-27 NOTE — PROGRESS NOTES
Mechelle Wise is a 34 year old female  Patient's last menstrual period was 2024 (exact date).   Chief Complaint   Patient presents with    Prenatal Care     Pt here for prenatal visit       OBSTETRICS HISTORY:     OB History    Para Term  AB Living   5 4 4     4   SAB IAB Ectopic Multiple Live Births           4      # Outcome Date GA Lbr Andrew/2nd Weight Sex Type Anes PTL Lv   5 Current            4 Term 10/11/15 40w0d  10 lb (4.536 kg) M NORMAL SPONT   TERRIE   3 Term 11    M NORMAL SPONT   TERRIE   2 Term 08    M NORMAL SPONT   TERRIE   1 Term 06    F NORMAL SPONT   TERRIE       GYNE HISTORY:         Period Cycle (Days): 28 (10/9/2024  1:35 PM)  Period Duration (Days): 3 (10/9/2024  1:35 PM)  Period Flow: moderate (10/9/2024  1:35 PM)  Use of Birth Control (if yes, specify type): None (10/9/2024  1:35 PM)  Hx Prior Abnormal Pap: No (10/9/2024  1:35 PM)        Latest Ref Rng & Units 2023     3:27 PM   RECENT PAP RESULTS   INTERPRETATION/RESULT: Negative for intraepithelial lesion or malignancy Negative for intraepithelial lesion or malignancy          MEDICAL HISTORY:     Past Medical History:    BMI 31.0-31.9,adult    Renal stones       SURGICAL HISTORY:     History reviewed. No pertinent surgical history.    SOCIAL HISTORY:     Social History     Socioeconomic History    Marital status: Single   Tobacco Use    Smoking status: Never     Passive exposure: Never    Smokeless tobacco: Never   Vaping Use    Vaping status: Never Used   Substance and Sexual Activity    Alcohol use: Never    Drug use: Never     Social Drivers of Health     Financial Resource Strain: Low Risk  (2024)    Received from [facility]    Overall Financial Resource Strain (CARDIA)     Difficulty of Paying Living Expenses: Not hard at all   Food Insecurity: No Food Insecurity (2024)    Received from [facility]    Food Insecurity     Within the past 30  days, I worried whether my food would run out before I got money to buy more. / En los últimos 30 días, me preocupó que la comida se podía acabar antes de tener dinero para compr...: Never true / Nunca     Within the past 30 days, the food that I bought just didn't last, and I didn't have money to get more. / En los últimos 30 días, La comida que compré no rindió lo suficiente, y no tenía dinero para...: Never true / Nunca   Housing Stability: Unknown (8/23/2024)    Received from [facility]    Housing Stability Vital Sign     Unable to Pay for Housing in the Last Year: No     Homeless in the Last Year: No        FAMILY HISTORY:     History reviewed. No pertinent family history.    MEDICATIONS:       Current Outpatient Medications:     Blood Glucose Monitoring Suppl (ONETOUCH ULTRA 2) w/Device Does not apply Kit, Fasting, 2 hours after breakfast, 2 hours after lunch, and 2 hours after dinner  Please substitute what is on formulary, Disp: 1 kit, Rfl: 0    Glucose Blood (ONETOUCH ULTRA) In Vitro Strip, Use strip to test blood glucose 4 times a day as directed. Please substitute what is on formulary, Disp: 200 strip, Rfl: 3    OneTouch UltraSoft Lancets Does not apply Misc, Finger stick route 4 (four) times daily.Please substitute what is on formulary, Disp: 200 each, Rfl: 2    ASPIRIN LOW DOSE 81 MG Oral Tab EC, Take 2 tablets (162 mg total) by mouth daily., Disp: , Rfl:     Ferrous Sulfate 325 (65 Fe) MG Oral Tab, Take 1 tablet (325 mg total) by mouth every other day., Disp: 90 tablet, Rfl: 3    Aspirin 81 MG Oral Cap, Take 2 tablets by mouth daily., Disp: 90 capsule, Rfl: 2    Prenatal Vit-DSS-Fe Fum-FA (PRENATAL 19) Oral Tab, Take 1 tablet by mouth daily., Disp: 90 tablet, Rfl: 1    ALLERGIES:     Allergies[1]      REVIEW OF SYSTEMS:     Constitutional:    denies fever / chills  Cardiovascular:  denies chest pain or palpitations  Respiratory:    denies shortness of breath  Gastrointestinal:   denies severe abdominal pain, frequent diarrhea or constipation, nausea / vomiting  Genitourinary:    denies dysuria, bothersome incontinence  Skin/Breast:   denies any breast pain, lumps, or discharge  Neurological:    denies frequent severe headaches  Psychiatric:   denies depression or anxiety, thoughts of harming self or others      PHYSICAL EXAM:   Blood pressure 102/69, pulse 92, weight 153 lb (69.4 kg), last menstrual period 06/27/2024.  Constitutional:  well developed, well nourished, no distress  Abdomen:   soft, gravid, nontender  Musculoskeletal: no cva tenderness bilaterally  Skin/Hair:  no unusual rashes or bruises  Extremities:  no edema, no cyanosis, non tender bilaterally  Psychiatric:   oriented to time, place, person and situation. Appropriate mood and affect      ASSESSMENT & PLAN:     Mechelle was seen today for prenatal care.    Diagnoses and all orders for this visit:    Diet controlled gestational diabetes mellitus (GDM) in second trimester (HCC)  -     Protein/Creatinine Ratio, Urine Random; Future      Bs reviewed (written) and wnl. S/p diabetic teaching, m following      FOLLOW-UP     No follow-ups on file.      Nikolay Gonzalez MD  11/27/2024         [1] No Known Allergies

## 2024-11-27 NOTE — TELEPHONE ENCOUNTER
Spoke with pt via [employer] ([id number]) regarding blood sugar log. Pt states she is unable to use the blood sugar log but is writing them down. Pt states she brought the sheet to her appt with Dr Gonzalez today and that he said they were good. Instructed pt that [facility] is managing her sugars and that we will need to see them. Pt states she will ask a friend to help her send a picture of her sugars to [facility] by Monday. Pt aware she can either call or message us if she needs help. Pt understands.

## 2024-12-02 ENCOUNTER — TELEPHONE (OUTPATIENT)
Dept: PERINATAL CARE | Facility: HOSPITAL | Age: 34
End: 2024-12-02

## 2024-12-02 NOTE — TELEPHONE ENCOUNTER
Blood Glucose flow sheet  reviewed  through 12/1        Recommendations    Continue diet    Blood glucose monitoring   With weekly review by ALISA

## 2024-12-09 ENCOUNTER — TELEPHONE (OUTPATIENT)
Dept: PERINATAL CARE | Facility: HOSPITAL | Age: 34
End: 2024-12-09

## 2024-12-09 NOTE — TELEPHONE ENCOUNTER
Blood Glucose flow sheet  reviewed  through 12/8        Recommendations    Continue Diet    Blood glucose monitoring   With weekly review by ALISA

## 2024-12-12 ENCOUNTER — HOSPITAL ENCOUNTER (OUTPATIENT)
Dept: PERINATAL CARE | Facility: HOSPITAL | Age: 34
Discharge: HOME | End: 2024-12-12
Attending: OBSTETRICS & GYNECOLOGY
Payer: COMMERCIAL

## 2024-12-12 VITALS
WEIGHT: 155 LBS | BODY MASS INDEX: 35 KG/M2 | HEART RATE: 96 BPM | SYSTOLIC BLOOD PRESSURE: 103 MMHG | DIASTOLIC BLOOD PRESSURE: 61 MMHG

## 2024-12-12 DIAGNOSIS — O24.410 DIET CONTROLLED GESTATIONAL DIABETES MELLITUS (GDM) IN SECOND TRIMESTER (HCC): Primary | ICD-10-CM

## 2024-12-12 DIAGNOSIS — O09.522 SUPERVISION OF ELDERLY MULTIGRAVIDA IN SECOND TRIMESTER (HCC): ICD-10-CM

## 2024-12-12 DIAGNOSIS — O24.410 DIET CONTROLLED GESTATIONAL DIABETES MELLITUS (GDM) IN SECOND TRIMESTER (HCC): ICD-10-CM

## 2024-12-12 DIAGNOSIS — E66.811 OBESITY (BMI 30.0-34.9): ICD-10-CM

## 2024-12-12 DIAGNOSIS — O24.410 DIET CONTROLLED GESTATIONAL DIABETES MELLITUS (GDM) IN THIRD TRIMESTER (HCC): ICD-10-CM

## 2024-12-12 PROCEDURE — 76816 OB US FOLLOW-UP PER FETUS: CPT | Performed by: OBSTETRICS & GYNECOLOGY

## 2024-12-12 NOTE — PROGRESS NOTES
Outpatient Maternal-Fetal Medicine Follow-Up     Dear Dr. Gonzalez,     Thank you for requesting ultrasound evaluation and maternal fetal medicine consultation on your patient Mechelle Wise.  As you are aware she is a 34/35 year old female  with a Kaur pregnancy and an Estimated Date of Delivery: 4/3/25.  She returned to maternal-fetal medicine today for a follow-up visit.  Her history was reviewed from her prior visit and there were no interval changes.     Antepartum Risk Factors  Advanced maternal age  Class I obesity  Early gestational diabetes diagnosis, diet-controlled    S: Reviewed her diet and she is doing well.  She reports good fetal movement.  We reviewed that she is testing her blood sugar fasting and then 2 hours after her breakfast, 2 hours after lunch, and 2 hours after dinner.    PHYSICAL EXAMINATION:  /61   Pulse 96   Wt 155 lb (70.3 kg)   LMP 2024 (Exact Date)   BMI 34.75 kg/m²   General: alert and oriented, no acute distress  Abdomen: gravid, soft, non-tender  Extremities: non-tender, no edema     OBSTETRIC ULTRASOUND  The patient had a follow-up fetal ultrasound today which I interpreted the results and reviewed them with the patient.    Ultrasound Findings:  Single IUP in cephalic presentation.    Placenta is posterior.   A 3 vessel cord,  is noted.  Cardiac activity is present at 140 bpm   g ( 1 lb 12 oz);   MVP is 5.5 cm . CHARU 16.8 cm    The fetal measurements are consistent with established EDC. No gross ultrasound evidence of structural abnormalities are seen today. The patient understands that ultrasound cannot rule out all structural and chromosomal abnormalities.        FETAL ECHOCARDIOGRAM:    Rare Irregular rhythm: PAC's noted today on US     A transabdominal 2D Doppler, fetal echocardiogram is performed. There is a four-chamber heart of appropriate cardiac dimensions. There is situs solitus with levocardia. Intracardiac connection appear to be  normal.  The  atrioventricular valves appear normal. There is no evidence of pericardial or pleural effusion. The A-V conduction is one to one and the heart rate is appropriate for gestational age. No evidence of fetal arrhythmias is seen during today's study. There is a right to left shunting across the patent mendoza ovale. There is a normal appearance of the aorta and branching pattern of the head vessels.    Normal fetal Doppler interrogations    There appears to be a structurally  normal fetal heart and rhythm. The patient was made aware of the limitations of the fetal heart study: malformations such as but not limiting to minor valve , VSD, anomalous pulmonary venus return, or coarctation of the aorta maybe missed. I discussed the results with the patient.          See imaging tab for the complete US report.           DISCUSSION  During her visit we discussed and reviewed the following issues:  OBESITY:  Her BMI prior to pregnancy was 31.5  Obesity during pregnancy is associated with numerous maternal and  risks which were discussed previously and reviewed.  See [facility] note from 2024 for detailed review.      ADVANCED MATERNAL AGE -discussed previously and reviewed.  See [facility] note from 2024 for detailed review    I reviewed with the patient that pregnancies in women of advanced maternal age (35 or older at delivery) are associated with elevated risks. Specifically, there is a higher rate of:  Fetal malformations  Preeclampsia  Gestational diabetes  Intrauterine fetal death    As a result, enhanced pregnancy surveillance is advised for these patients including a comprehensive ultrasound to assess for fetal malformations (at 20 weeks) and a third trimester ultrasound assessment for fetal growth (at 32 weeks). In addition, weekly NST's (initiating at 36 weeks gestation for women 35-39 years and at 32 weeks gestation for women 40 years and older) are also advised. Routine obstetric care is more  than adequate to assess for gestational diabetes and preeclampsia; hence, no further significant alterations in obstetric care are advised.    GESTATIONAL DIABETES -follow-up    3 hour GTT  Lab Results   Component Value Date    GLUFG 85 10/26/2024    GLU1G 209 (H) 10/26/2024    GLU2G 171 (H) 10/26/2024    GLU3G 132 10/26/2024       We reviewed the the potential implications and risks associated with GDM to her and her fetus, especially when poorly controlled. We discussed the increased incidence of macrosomia and the potential for related birth injury to her and her baby. We talked about the increase risks associated risk of fetal hyperinsulinemia, jaundice, electrolyte imbalance, seizure activity, IUFD and other adverse obstetric outcomes. We also reviewed her  increased risk of having diabetes later in life. The importance of good glycemic control and avoidance of prolonged hypoglycemia and hyperglycemia was addressed.  See [facility] note from 2024 for detailed review    Blood sugars were reviewed, her compliance is good.  Fasting blood sugars are 87-96 with 2 being elevated  Or after breakfast readings are 100 or less after lunch & dinner her  blood sugars are 100 mg/dL or less.      The patient is presently on diet therapy; her compliance with her diabetic diet regimen was reviewed and it is good.    We compared and contrasted insulin (long and short acting) and metformin to manage blood sugars in pregnancy.  We discussed insulin as the gold standard therapy in pregnancy and that it does not cross to the fetal circulation.  Metformin is increasingly being used in pregnancy but the long term data on / childhood effects are lacking.  Metformin crosses the placenta and  levels are >70% of the maternal level at delivery.  Metformin is not associated with increased rates for NICU admission,  hypoglycemia or LGA when its use controls the blood sugars to the recommended targets.  There is  emerging information,however, that there is an increase in childhood obesity, and possibly metabolic syndrome in children exposed to Metformin throughout the entire pregnancy.  Currently, use in the third trimester for management of gestational diabetes is within the standard of care.     Based on the above discussion, should the need for improved glycemic control arise, Mechelle would prefer to start on insulin.      Medical Regimen Recommendation:   Continue ADA diet & BS monitoring  She was instructed to send her blood sugars to [facility] weekly for review.      We discussed the recommended plan of care based on her  risk factors.  Mechelle had her questions answered to her satisfaction.   ID# [id number]      IMPRESSION:  IUP at 24w0d  Normal fetal growth  Normal fetal echocardiogram with rare premature atrial contractions noted  Advanced maternal age, low risk cell free DNA screen.  Testing appropriately declined  Early diagnosis of gestational diabetes -diet controlled  Class I obesity complicating pregnancy    RECOMMENDATIONS:  Continue care with Dr. Gonzalez  Check glucoses 4x/day and send to [facility] weekly for review  Monthly growth US with addition of a BPP with each growth assessment at or beyond 32 weeks  Weekly NST at 36 weeks however if she requires medication for diabetes her testing should be increased to the following:  weekly NST at  32 wks / twice weekly at  34  wks      Hgb A1C q trimester  Baseline urine P/C ratio   EKG     Aspirin 81-120mg daily   Delivery for well controlled pre-existing diabetes without other complication 39-39w6d  Delivery for poorly controlled pre-existing diabetes with other complications 36-38w6d.    Total time spent 30 minutes this calendar day which includes preparing to see the patient including chart review, obtaining and/or reviewing additional medical history, performing a physical exam and evaluation, documenting clinical information in the electronic medical record,  independently interpreting results, counseling the patient, communicating results to the patient/family/caregiver and coordinating care.     Case discussed with patient who demonstrated understanding and agreement with plan.     Thank you for allowing me to participate in the care of this patient.  Please feel free to contact me with any questions.    Swapna Brown MD  Maternal-Fetal Medicine       Note to patient and family:  The 21st Century Cures Act makes medical notes available to patients in the interest of transparency.  However, please be advised that this is a medical document.  It is intended as a peer to peer communication.  It is written in medical language and may contain abbreviations or verbiage that are technical and unfamiliar.  It may appear blunt or direct.  Medical documents are intended to carry relevant information, facts as evident, and the clinical opinion of the practitioner.

## 2024-12-16 ENCOUNTER — TELEPHONE (OUTPATIENT)
Dept: PERINATAL CARE | Facility: HOSPITAL | Age: 34
End: 2024-12-16

## 2024-12-18 ENCOUNTER — ROUTINE PRENATAL (OUTPATIENT)
Dept: OBGYN CLINIC | Facility: CLINIC | Age: 34
End: 2024-12-18
Payer: COMMERCIAL

## 2024-12-18 ENCOUNTER — TELEPHONE (OUTPATIENT)
Dept: PERINATAL CARE | Facility: HOSPITAL | Age: 34
End: 2024-12-18

## 2024-12-18 VITALS
SYSTOLIC BLOOD PRESSURE: 105 MMHG | BODY MASS INDEX: 34 KG/M2 | DIASTOLIC BLOOD PRESSURE: 66 MMHG | HEART RATE: 91 BPM | WEIGHT: 152 LBS

## 2024-12-18 DIAGNOSIS — O24.410 DIET CONTROLLED GESTATIONAL DIABETES MELLITUS (GDM) IN SECOND TRIMESTER (HCC): Primary | ICD-10-CM

## 2024-12-18 PROCEDURE — 3074F SYST BP LT 130 MM HG: CPT | Performed by: OBSTETRICS & GYNECOLOGY

## 2024-12-18 PROCEDURE — 3078F DIAST BP <80 MM HG: CPT | Performed by: OBSTETRICS & GYNECOLOGY

## 2024-12-18 NOTE — TELEPHONE ENCOUNTER
Blood Glucose flow sheet  reviewed  through 12/17        Recommendations    Continue diet    Blood glucose monitoring   With weekly review by ALISA

## 2024-12-18 NOTE — PROGRESS NOTES
Mechelle Wise is a 34 year old female  Patient's last menstrual period was 2024 (exact date).   Chief Complaint   Patient presents with    Prenatal Care     Pt presents for repeat OB visit, pt c/o shortness of breath and dizzyness       OBSTETRICS HISTORY:     OB History    Para Term  AB Living   5 4 4     4   SAB IAB Ectopic Multiple Live Births           4      # Outcome Date GA Lbr Andrew/2nd Weight Sex Type Anes PTL Lv   5 Current            4 Term 10/11/15 40w0d  10 lb (4.536 kg) M NORMAL SPONT   TERRIE   3 Term 11    M NORMAL SPONT   TERRIE   2 Term 08    M NORMAL SPONT   TERRIE   1 Term 06    F NORMAL SPONT   TERRIE       GYNE HISTORY:         Period Cycle (Days): 28 (10/9/2024  1:35 PM)  Period Duration (Days): 3 (10/9/2024  1:35 PM)  Period Flow: moderate (10/9/2024  1:35 PM)  Use of Birth Control (if yes, specify type): None (10/9/2024  1:35 PM)  Hx Prior Abnormal Pap: No (10/9/2024  1:35 PM)        Latest Ref Rng & Units 2023     3:27 PM   RECENT PAP RESULTS   INTERPRETATION/RESULT: Negative for intraepithelial lesion or malignancy Negative for intraepithelial lesion or malignancy          MEDICAL HISTORY:     Past Medical History:    BMI 31.0-31.9,adult    Renal stones       SURGICAL HISTORY:     History reviewed. No pertinent surgical history.    SOCIAL HISTORY:     Social History     Socioeconomic History    Marital status: Single   Tobacco Use    Smoking status: Never     Passive exposure: Never    Smokeless tobacco: Never   Vaping Use    Vaping status: Never Used   Substance and Sexual Activity    Alcohol use: Never    Drug use: Never     Social Drivers of Health     Financial Resource Strain: Low Risk  (2024)    Received from [facility]    Overall Financial Resource Strain (CARDIA)     Difficulty of Paying Living Expenses: Not hard at all   Food Insecurity: No Food Insecurity (2024)    Received from [facility]     Food Insecurity     Within the past 30 days, I worried whether my food would run out before I got money to buy more. / En los últimos 30 días, me preocupó que la comida se podía acabar antes de tener dinero para compr...: Never true / Nunca     Within the past 30 days, the food that I bought just didn't last, and I didn't have money to get more. / En los últimos 30 días, La comida que compré no rindió lo suficiente, y no tenía dinero para...: Never true / Nunca   Housing Stability: Unknown (8/23/2024)    Received from [facility]    Housing Stability Vital Sign     Unable to Pay for Housing in the Last Year: No     Homeless in the Last Year: No        FAMILY HISTORY:     History reviewed. No pertinent family history.    MEDICATIONS:       Current Outpatient Medications:     Aspirin 81 MG Oral Cap, Take 2 tablets by mouth daily., Disp: 90 capsule, Rfl: 2    Blood Glucose Monitoring Suppl (ONETOUCH ULTRA 2) w/Device Does not apply Kit, Fasting, 2 hours after breakfast, 2 hours after lunch, and 2 hours after dinner  Please substitute what is on formulary, Disp: 1 kit, Rfl: 0    Glucose Blood (ONETOUCH ULTRA) In Vitro Strip, Use strip to test blood glucose 4 times a day as directed. Please substitute what is on formulary, Disp: 200 strip, Rfl: 3    OneTouch UltraSoft Lancets Does not apply Misc, Finger stick route 4 (four) times daily.Please substitute what is on formulary, Disp: 200 each, Rfl: 2    ASPIRIN LOW DOSE 81 MG Oral Tab EC, Take 2 tablets (162 mg total) by mouth daily., Disp: , Rfl:     Ferrous Sulfate 325 (65 Fe) MG Oral Tab, Take 1 tablet (325 mg total) by mouth every other day., Disp: 90 tablet, Rfl: 3    Aspirin 81 MG Oral Cap, Take 2 tablets by mouth daily., Disp: 90 capsule, Rfl: 2    Prenatal Vit-DSS-Fe Fum-FA (PRENATAL 19) Oral Tab, Take 1 tablet by mouth daily., Disp: 90 tablet, Rfl: 1    ALLERGIES:     Allergies[1]      REVIEW OF SYSTEMS:     Constitutional:    denies fever /  chills  Cardiovascular:  denies chest pain or palpitations  Respiratory:    denies shortness of breath  Gastrointestinal:  denies severe abdominal pain, frequent diarrhea or constipation, nausea / vomiting  Genitourinary:    denies dysuria, bothersome incontinence  Skin/Breast:   denies any breast pain, lumps, or discharge  Neurological:    denies frequent severe headaches  Psychiatric:   denies depression or anxiety, thoughts of harming self or others      PHYSICAL EXAM:   Blood pressure 105/66, pulse 91, weight 152 lb (68.9 kg), last menstrual period 06/27/2024.  Constitutional:  well developed, well nourished, no distress  Abdomen:   soft, gravid, nontender  Musculoskeletal: no cva tenderness bilaterally  Skin/Hair:  no unusual rashes or bruises  Extremities:  no edema, no cyanosis, non tender bilaterally  Psychiatric:   oriented to time, place, person and situation. Appropriate mood and affect      ASSESSMENT & PLAN:     Mechelle was seen today for prenatal care.    Diagnoses and all orders for this visit:    Diet controlled gestational diabetes mellitus (GDM) in second trimester (HCC)  -     Aspirin 81 MG Oral Cap; Take 2 tablets by mouth daily.  -     EKG 12 Lead; Future    Bs reviewed and wnl. Cont diet, also per \mfm  F/u 3 wks.   EKG for diabetes      FOLLOW-UP     No follow-ups on file.      Nikolay Gonzalez MD  12/18/2024         [1] No Known Allergies

## 2024-12-26 ENCOUNTER — TELEPHONE (OUTPATIENT)
Dept: PERINATAL CARE | Facility: HOSPITAL | Age: 34
End: 2024-12-26

## 2024-12-27 ENCOUNTER — TELEPHONE (OUTPATIENT)
Dept: PERINATAL CARE | Facility: HOSPITAL | Age: 34
End: 2024-12-27

## 2024-12-27 NOTE — TELEPHONE ENCOUNTER
26w1d  Received BS log for date range 12/20-12/26     Low  High Out of Range   Fasting Blood Sugar 87 98 3/7   Post Breakfast 75 100 0/7   Post Lunch 75 100 0/7   Post Dinner 90 100 0/5     diet

## 2025-01-03 ENCOUNTER — TELEPHONE (OUTPATIENT)
Dept: PERINATAL CARE | Facility: HOSPITAL | Age: 35
End: 2025-01-03

## 2025-01-03 NOTE — TELEPHONE ENCOUNTER
Blood Glucose flow sheet  reviewed  through 1/1/25        Recommendations  Continue diabetic diet  Blood glucose monitoring   With weekly review by ALISA

## 2025-01-06 NOTE — PROGRESS NOTES
Outpatient Maternal-Fetal Medicine Follow-Up     Dear Dr. Gonzalez,     Thank you for requesting ultrasound evaluation and maternal fetal medicine consultation on your patient Mechelle Wise.  As you are aware she is a 34/35 year old female  with a Kaur pregnancy and an Estimated Date of Delivery: 4/3/25.  She returned to maternal-fetal medicine today for a follow-up visit.  Her history was reviewed from her prior visit and there were no interval changes.     Antepartum Risk Factors  Advanced maternal age  Class I obesity  Early gestational diabetes diagnosis, diet-controlled     PHYSICAL EXAMINATION:  BP 91/52   Pulse 97   Wt 155 lb (70.3 kg)   LMP 2024 (Exact Date)   BMI 34.75 kg/m²   General: alert and oriented, no acute distress  Abdomen: gravid, soft, non-tender  Extremities: non-tender, no edema     OBSTETRIC ULTRASOUND    Ultrasound Findings:  Single IUP in cephalic presentation.    Placenta is posterior.   A 3 vessel cord is noted.  Cardiac activity is present at 147 bpm  EFW 1479 g ( 3 lb 4 oz); 78%.    MVP is 5.5 cm . CHARU 13.6 cm    The fetal measurements are consistent with established EDC. No gross ultrasound evidence of structural abnormalities are seen today. The patient understands that ultrasound cannot rule out all structural and chromosomal abnormalities.      See imaging tab for the complete US report.     DISCUSSION  During her visit we discussed and reviewed the following issues:  OBESITY:  Her BMI prior to pregnancy was 31.5   See MFM note from 2024 for detailed review.       ADVANCED MATERNAL AGE  See prior M notes for a detailed review.  She did not desire invasive genetic testing.   She has already obtained a low-risk NPIT result and was appropriately reassured.     GESTATIONAL DIABETES - follow-up  ADA diet recommendations were reviewed and the patient's dietary compliance is adequate.  Her capillary blood glucose records were reviewed today; her compliance  with the recommended four times daily assessments (fasting and two-hour post-prandial) is good.   Her overall glucose control is good.    Medical Regimen Recommendation: no change.    Continued medication use and capillary blood glucose assessments were reviewed as well as recommendations for serial growth ultrasounds and antepartum testing.    IMPRESSION:  IUP at 28w1d  Advanced maternal age, low risk cell free DNA screen.    Early diagnosis of gestational diabetes -diet controlled  Class I obesity complicating pregnancy     RECOMMENDATIONS:  Continue care with Dr. Gonzalez  Check glucoses 4x/day and send to [facility] weekly for review  Monthly growth US with addition of a BPP with each growth assessment at or beyond 32 weeks  Weekly NST at 36 weeks however if she requires medication for diabetes her testing should be increased to the following:  weekly NST at  32 wks / twice weekly at  34  wks      Hgb A1C q trimester  Baseline urine P/C ratio   EKG     Aspirin 81-120mg daily   Delivery for well controlled pre-existing diabetes without other complication 39-39w6d  Delivery for poorly controlled pre-existing diabetes with other complications 36-38w6d.       Thank you for allowing me to participate in the care of this patient.  Please feel free to contact me with any questions.     Terry Beasley M.D.  Maternal-Fetal Medicine    30 minutes spent in review of records, patient consultation, documentation and coordination of care.  The relevant clinical matter(s) are summarized above.      Note to patient and family:  The 21st Century Cures Act makes medical notes available to patients in the interest of transparency.  However, please be advised that this is a medical document.  It is intended as a peer to peer communication.  It is written in medical language and may contain abbreviations or verbiage that are technical and unfamiliar.  It may appear blunt or direct.  Medical documents are intended to carry relevant  information, facts as evident, and the clinical opinion of the practitioner.

## 2025-01-07 ENCOUNTER — HOSPITAL ENCOUNTER (OUTPATIENT)
Dept: ENDOCRINOLOGY | Age: 35
Discharge: HOME | End: 2025-01-07
Attending: OBSTETRICS & GYNECOLOGY
Payer: MEDICAID

## 2025-01-07 VITALS — WEIGHT: 153.38 LBS | BODY MASS INDEX: 34 KG/M2

## 2025-01-07 DIAGNOSIS — O24.419 GESTATIONAL DIABETES MELLITUS (GDM) IN SECOND TRIMESTER, GESTATIONAL DIABETES METHOD OF CONTROL UNSPECIFIED (HCC): Primary | ICD-10-CM

## 2025-01-07 NOTE — PROGRESS NOTES
Mechelle Wise  : 1990 was seen for GDM  Individual Follow-Up Counseling    Date: 2025  Referring Provider: Dr. Gonzalez  Start time: 3:40 pm  End time: 4:15 pm     Assessment: LMP 2024 (Exact Date)   Weight:   Wt Readings from Last 6 Encounters:   24 152 lb   24 155 lb   24 153 lb   24 156 lb 3.2 oz   24 155 lb   24 155 lb       Blood Glucose: FB-95. PP: B: ; L: 95-85; D: .      Gestational Diabetes goals assessed by patient: 2 - occasional, barriers to attaining goals discussed and additional modifications to goals made    Diet:   No food log available for review. The following assessment is based on the patient's verbal report.   Following meal plan: Yes/No: Yes  Skips: AM Snack  Meals are Balanced.  Carb Intake is adequate.  Protein Intake is adequate.  Food Selections are healthy.    Exercise:     Walks  for 10-15 minutes  3 times a week. Patient reports walking has diminished due to cold w    Education:     GDM Overview:  Reviewed target blood glucose values for GDM.  Discussed benefits/risks to mother/baby management options to improve/maintain glucose control.     Healthy Eating:  Reviewed/Reinforced:  Nutrition concepts for pregnancy/healthy eating and effect of food on blood glucose.  Meal planning process and benefits of pre-planning meals/snacks.  Appropriate timing of meals/snacks.  Carb counting.  Additional concepts: Increasing fiber, Controlling sodium, and Reducing fat    Being Active:  Reviewed benefits of effects of activity on BG values.  Reviewed types of activity, duration, precautions.    Monitoring:  Instructed to report readings to MD as directed.  Call MD: if FBG is > 95 twice in 1 week.     If 2 hr PP is > 120 twice in 1 week at any one meal.  Call Diabetic Educator is ketones are consistently elevated.    Reducing Risk:  Discussed management of (hyperglycemia, hypoglycemia) and when to call  provider.    Recommendations:      1. Follow recommended meal plan.   2. Test blood glucose and ketones as directed.    3. Please send blood glucose records to your current ob/gyn or midwife within 3 days - 1      week following this visit or as requested by your provider.   4. Start/continue activity if no restrictions.    5. Additional recommendations: Continue water intake daily.    Patient verbalized understanding and has no further questions at this time.    Jyothi Linares RN

## 2025-01-08 ENCOUNTER — ROUTINE PRENATAL (OUTPATIENT)
Dept: OBGYN CLINIC | Facility: CLINIC | Age: 35
End: 2025-01-08

## 2025-01-08 VITALS
SYSTOLIC BLOOD PRESSURE: 90 MMHG | HEART RATE: 89 BPM | DIASTOLIC BLOOD PRESSURE: 53 MMHG | BODY MASS INDEX: 35 KG/M2 | WEIGHT: 155 LBS

## 2025-01-08 DIAGNOSIS — O24.410 DIET CONTROLLED GESTATIONAL DIABETES MELLITUS (GDM) IN SECOND TRIMESTER (HCC): Primary | ICD-10-CM

## 2025-01-08 PROCEDURE — 99214 OFFICE O/P EST MOD 30 MIN: CPT | Performed by: OBSTETRICS & GYNECOLOGY

## 2025-01-08 PROCEDURE — 90471 IMMUNIZATION ADMIN: CPT | Performed by: OBSTETRICS & GYNECOLOGY

## 2025-01-08 PROCEDURE — 90715 TDAP VACCINE 7 YRS/> IM: CPT | Performed by: OBSTETRICS & GYNECOLOGY

## 2025-01-08 NOTE — PROGRESS NOTES
Mechelle Wise is a 34 year old female  Patient's last menstrual period was 2024 (exact date).   Chief Complaint   Patient presents with    Prenatal Care     Pt here for prenatal visit       OBSTETRICS HISTORY:     OB History    Para Term  AB Living   5 4 4     4   SAB IAB Ectopic Multiple Live Births           4      # Outcome Date GA Lbr Andrew/2nd Weight Sex Type Anes PTL Lv   5 Current            4 Term 10/11/15 40w0d  10 lb (4.536 kg) M NORMAL SPONT   TERRIE   3 Term 11    M NORMAL SPONT   TERRIE   2 Term 08    M NORMAL SPONT   TERIRE   1 Term 06    F NORMAL SPONT   TERRIE       GYNE HISTORY:         Period Cycle (Days): 28 (10/9/2024  1:35 PM)  Period Duration (Days): 3 (10/9/2024  1:35 PM)  Period Flow: moderate (10/9/2024  1:35 PM)  Use of Birth Control (if yes, specify type): None (10/9/2024  1:35 PM)  Hx Prior Abnormal Pap: No (10/9/2024  1:35 PM)        Latest Ref Rng & Units 2023     3:27 PM   RECENT PAP RESULTS   INTERPRETATION/RESULT: Negative for intraepithelial lesion or malignancy Negative for intraepithelial lesion or malignancy          MEDICAL HISTORY:     Past Medical History:    BMI 31.0-31.9,adult    Renal stones       SURGICAL HISTORY:     History reviewed. No pertinent surgical history.    SOCIAL HISTORY:     Social History     Socioeconomic History    Marital status: Single   Tobacco Use    Smoking status: Never     Passive exposure: Never    Smokeless tobacco: Never   Vaping Use    Vaping status: Never Used   Substance and Sexual Activity    Alcohol use: Never    Drug use: Never     Social Drivers of Health     Financial Resource Strain: Low Risk  (2024)    Received from "[employer]"    Overall Financial Resource Strain (CARDIA)     Difficulty of Paying Living Expenses: Not hard at all   Food Insecurity: No Food Insecurity (2024)    Received from [facility]    Food Insecurity     Within the past 30  days, I worried whether my food would run out before I got money to buy more. / En los últimos 30 días, me preocupó que la comida se podía acabar antes de tener dinero para compr...: Never true / Nunca     Within the past 30 days, the food that I bought just didn't last, and I didn't have money to get more. / En los últimos 30 días, La comida que compré no rindió lo suficiente, y no tenía dinero para...: Never true / Nunca   Housing Stability: Unknown (8/23/2024)    Received from [facility]    Housing Stability Vital Sign     Unable to Pay for Housing in the Last Year: No     Homeless in the Last Year: No        FAMILY HISTORY:     History reviewed. No pertinent family history.    MEDICATIONS:       Current Outpatient Medications:     Aspirin 81 MG Oral Cap, Take 2 tablets by mouth daily., Disp: 90 capsule, Rfl: 2    Blood Glucose Monitoring Suppl (ONETOUCH ULTRA 2) w/Device Does not apply Kit, Fasting, 2 hours after breakfast, 2 hours after lunch, and 2 hours after dinner  Please substitute what is on formulary, Disp: 1 kit, Rfl: 0    Glucose Blood (ONETOUCH ULTRA) In Vitro Strip, Use strip to test blood glucose 4 times a day as directed. Please substitute what is on formulary, Disp: 200 strip, Rfl: 3    OneTouch UltraSoft Lancets Does not apply Misc, Finger stick route 4 (four) times daily.Please substitute what is on formulary, Disp: 200 each, Rfl: 2    ASPIRIN LOW DOSE 81 MG Oral Tab EC, Take 2 tablets (162 mg total) by mouth daily., Disp: , Rfl:     Ferrous Sulfate 325 (65 Fe) MG Oral Tab, Take 1 tablet (325 mg total) by mouth every other day., Disp: 90 tablet, Rfl: 3    Prenatal Vit-DSS-Fe Fum-FA (PRENATAL 19) Oral Tab, Take 1 tablet by mouth daily., Disp: 90 tablet, Rfl: 1    ALLERGIES:     Allergies[1]      REVIEW OF SYSTEMS:     Constitutional:    denies fever / chills  Cardiovascular:  denies chest pain or palpitations  Respiratory:    denies shortness of breath  Gastrointestinal:   denies severe abdominal pain, frequent diarrhea or constipation, nausea / vomiting  Genitourinary:    denies dysuria, bothersome incontinence  Skin/Breast:   denies any breast pain, lumps, or discharge  Neurological:    denies frequent severe headaches  Psychiatric:   denies depression or anxiety, thoughts of harming self or others      PHYSICAL EXAM:   Blood pressure 90/53, pulse 89, weight 155 lb (70.3 kg), last menstrual period 2024.  Constitutional:  well developed, well nourished, no distress  Abdomen:   soft, gravid, nontender  Musculoskeletal: no cva tenderness bilaterally  Skin/Hair:  no unusual rashes or bruises  Extremities:  no edema, no cyanosis, non tender bilaterally  Psychiatric:   oriented to time, place, person and situation. Appropriate mood and affect      ASSESSMENT & PLAN:     Mechelle was seen today for prenatal care.    Diagnoses and all orders for this visit:    Diet controlled gestational diabetes mellitus (GDM) in second trimester (HCC)  -     HIV Ag/Ab Combo; Future  -     T Pallidum Screening Cascade; Future  -     Hemoglobin A1C; Future  -     CBC, Platelet; No Differential; Future  -     Ferritin; Future  -     TETANUS, DIPHTHERIA TOXOIDS AND ACELLULAR PERTUSIS VACCINE (TDAP), >7 YEARS, IM USE      Fetal kick counts discussed with  patient, labor precautions given.  Cont diet for gdma1  Labs ordered  Tdap today.   Prenatal checkless second trimester counseling has been completed.  I spent 32 minutes face-to-face with the patient, over half of the time in discussion and counseling of the below factors:  -Abnormal lab values.  I discussed all the current lab values that have returned.  I spent time discussing both normal and any abnormal lab values.  -Selecting a  care provider.  The patient was asked if she has a specific pediatrician that she would like for me to assign.  There is an [place] group that can be assigned if the patient does not have a specific designated  provider.  -Prenatal classes.  I discussed with the patient that she can sign up for prenatal classes at [email or web address].  -Signs and symptoms of  labor.  We discussed symptoms such as vaginal bleeding and leakage of fluid vaginally.  These are symptoms that should be brought to the attention of one of the providers.  We discussed round ligament pain and how to help differentiate from  labor pain.  We discussed symptoms and treatment of lower back pain and how to prevent back injury in pregnancy.      FOLLOW-UP     No follow-ups on file.      Nikolay Gonzalez MD  2025         [1] No Known Allergies

## 2025-01-10 ENCOUNTER — HOSPITAL ENCOUNTER (OUTPATIENT)
Dept: PERINATAL CARE | Facility: HOSPITAL | Age: 35
Discharge: HOME | End: 2025-01-10
Attending: OBSTETRICS & GYNECOLOGY
Payer: COMMERCIAL

## 2025-01-10 VITALS
BODY MASS INDEX: 35 KG/M2 | WEIGHT: 155 LBS | HEART RATE: 97 BPM | SYSTOLIC BLOOD PRESSURE: 91 MMHG | DIASTOLIC BLOOD PRESSURE: 52 MMHG

## 2025-01-10 DIAGNOSIS — O09.523 AMA (ADVANCED MATERNAL AGE) MULTIGRAVIDA 35+, THIRD TRIMESTER (HCC): ICD-10-CM

## 2025-01-10 DIAGNOSIS — E66.811 OBESITY (BMI 30.0-34.9): ICD-10-CM

## 2025-01-10 DIAGNOSIS — O24.410 DIET CONTROLLED GESTATIONAL DIABETES MELLITUS (GDM) IN SECOND TRIMESTER (HCC): Primary | ICD-10-CM

## 2025-01-10 DIAGNOSIS — O24.410 DIET CONTROLLED GESTATIONAL DIABETES MELLITUS (GDM) IN SECOND TRIMESTER (HCC): ICD-10-CM

## 2025-01-10 DIAGNOSIS — O09.522 SUPERVISION OF ELDERLY MULTIGRAVIDA IN SECOND TRIMESTER (HCC): ICD-10-CM

## 2025-01-10 PROCEDURE — 76816 OB US FOLLOW-UP PER FETUS: CPT | Performed by: OBSTETRICS & GYNECOLOGY

## 2025-01-11 ENCOUNTER — LAB ENCOUNTER (OUTPATIENT)
Dept: LAB | Facility: HOSPITAL | Age: 35
End: 2025-01-11
Attending: OBSTETRICS & GYNECOLOGY
Payer: COMMERCIAL

## 2025-01-11 DIAGNOSIS — O24.410 DIET CONTROLLED GESTATIONAL DIABETES MELLITUS (GDM) IN SECOND TRIMESTER (HCC): ICD-10-CM

## 2025-01-11 LAB
CREAT UR-SCNC: 40.7 MG/DL
DEPRECATED HBV CORE AB SER IA-ACNC: 37 NG/ML
DEPRECATED RDW RBC AUTO: 47.8 FL (ref 35.1–46.3)
ERYTHROCYTE [DISTWIDTH] IN BLOOD BY AUTOMATED COUNT: 14.1 % (ref 11–15)
EST. AVERAGE GLUCOSE BLD GHB EST-MCNC: 97 MG/DL (ref 68–126)
HBA1C MFR BLD: 5 % (ref ?–5.7)
HCT VFR BLD AUTO: 32.3 %
HGB BLD-MCNC: 11.3 G/DL
MCH RBC QN AUTO: 32.7 PG (ref 26–34)
MCHC RBC AUTO-ENTMCNC: 35 G/DL (ref 31–37)
MCV RBC AUTO: 93.4 FL
PLATELET # BLD AUTO: 199 10(3)UL (ref 150–450)
PROT UR-MCNC: 11.6 MG/DL (ref ?–14)
PROT/CREAT UR-RTO: 0.29
RBC # BLD AUTO: 3.46 X10(6)UL
T PALLIDUM AB SER QL IA: NONREACTIVE
WBC # BLD AUTO: 8.5 X10(3) UL (ref 4–11)

## 2025-01-11 PROCEDURE — 86780 TREPONEMA PALLIDUM: CPT

## 2025-01-11 PROCEDURE — 82570 ASSAY OF URINE CREATININE: CPT

## 2025-01-11 PROCEDURE — 84156 ASSAY OF PROTEIN URINE: CPT

## 2025-01-11 PROCEDURE — 82728 ASSAY OF FERRITIN: CPT

## 2025-01-11 PROCEDURE — 87389 HIV-1 AG W/HIV-1&-2 AB AG IA: CPT

## 2025-01-11 PROCEDURE — 85027 COMPLETE CBC AUTOMATED: CPT

## 2025-01-11 PROCEDURE — 36415 COLL VENOUS BLD VENIPUNCTURE: CPT

## 2025-01-11 PROCEDURE — 83036 HEMOGLOBIN GLYCOSYLATED A1C: CPT

## 2025-01-17 ENCOUNTER — TELEPHONE (OUTPATIENT)
Dept: PERINATAL CARE | Facility: HOSPITAL | Age: 35
End: 2025-01-17

## 2025-01-20 ENCOUNTER — TELEPHONE (OUTPATIENT)
Dept: PERINATAL CARE | Facility: HOSPITAL | Age: 35
End: 2025-01-20

## 2025-01-22 ENCOUNTER — ROUTINE PRENATAL (OUTPATIENT)
Dept: OBGYN CLINIC | Facility: CLINIC | Age: 35
End: 2025-01-22

## 2025-01-22 ENCOUNTER — TELEPHONE (OUTPATIENT)
Dept: PERINATAL CARE | Facility: HOSPITAL | Age: 35
End: 2025-01-22

## 2025-01-22 VITALS
HEART RATE: 101 BPM | WEIGHT: 155 LBS | SYSTOLIC BLOOD PRESSURE: 101 MMHG | DIASTOLIC BLOOD PRESSURE: 66 MMHG | BODY MASS INDEX: 35 KG/M2

## 2025-01-22 DIAGNOSIS — O24.410 DIET CONTROLLED GESTATIONAL DIABETES MELLITUS (GDM) IN THIRD TRIMESTER (HCC): Primary | ICD-10-CM

## 2025-01-22 PROCEDURE — 99214 OFFICE O/P EST MOD 30 MIN: CPT | Performed by: OBSTETRICS & GYNECOLOGY

## 2025-01-22 NOTE — TELEPHONE ENCOUNTER
Pt  29 6/7 weeks  Blood glucose log from  1/13-1/19        Low  High Out of Range   Fasting Blood Sugar 85 98 3/7   Post Breakfast 75 100 0/7   Post Lunch 85 100 0/7   Post Dinner 75 100 0/7         Taking      ADA diet     NO CHANGE  DT

## 2025-01-22 NOTE — TELEPHONE ENCOUNTER
Pt  29 6/7 weeks  Blood glucose log from  1/13-1/19      Low  High Out of Range   Fasting Blood Sugar 85 98 3/7   Post Breakfast 75 100 0/7   Post Lunch 85 100 0/7   Post Dinner 75 100 0/7       Taking     ADA diet

## 2025-01-22 NOTE — PROGRESS NOTES
Mechelle Wise is a 35 year old female  Patient's last menstrual period was 2024 (exact date).   Chief Complaint   Patient presents with    Prenatal Care     Pt presents for repeat OB visit        OBSTETRICS HISTORY:     OB History    Para Term  AB Living   5 4 4     4   SAB IAB Ectopic Multiple Live Births           4      # Outcome Date GA Lbr Andrew/2nd Weight Sex Type Anes PTL Lv   5 Current            4 Term 10/11/15 40w0d  10 lb (4.536 kg) M NORMAL SPONT   TERRIE   3 Term 11    M NORMAL SPONT   TERRIE   2 Term 08    M NORMAL SPONT   TERRIE   1 Term 06    F NORMAL SPONT   TERRIE       GYNE HISTORY:         Period Cycle (Days): 28 (10/9/2024  1:35 PM)  Period Duration (Days): 3 (10/9/2024  1:35 PM)  Period Flow: moderate (10/9/2024  1:35 PM)  Use of Birth Control (if yes, specify type): None (10/9/2024  1:35 PM)  Hx Prior Abnormal Pap: No (10/9/2024  1:35 PM)        Latest Ref Rng & Units 2023     3:27 PM   RECENT PAP RESULTS   INTERPRETATION/RESULT: Negative for intraepithelial lesion or malignancy Negative for intraepithelial lesion or malignancy          MEDICAL HISTORY:     Past Medical History:    BMI 31.0-31.9,adult    Renal stones       SURGICAL HISTORY:     History reviewed. No pertinent surgical history.    SOCIAL HISTORY:     Social History     Socioeconomic History    Marital status: Single   Tobacco Use    Smoking status: Never     Passive exposure: Never    Smokeless tobacco: Never   Vaping Use    Vaping status: Never Used   Substance and Sexual Activity    Alcohol use: Never    Drug use: Never     Social Drivers of Health     Financial Resource Strain: Low Risk  (2024)    Received from [employer]    Overall Financial Resource Strain (CARDIA)     Difficulty of Paying Living Expenses: Not hard at all   Food Insecurity: No Food Insecurity (2024)    Received from [facility]    Food Insecurity     Within the past  30 days, I worried whether my food would run out before I got money to buy more. / En los últimos 30 días, me preocupó que la comida se podía acabar antes de tener dinero para compr...: Never true / Nunca     Within the past 30 days, the food that I bought just didn't last, and I didn't have money to get more. / En los últimos 30 días, La comida que compré no rindió lo suficiente, y no tenía dinero para...: Never true / Nunca   Housing Stability: Unknown (8/23/2024)    Received from [facility]    Housing Stability Vital Sign     Unable to Pay for Housing in the Last Year: No     Homeless in the Last Year: No        FAMILY HISTORY:     History reviewed. No pertinent family history.    MEDICATIONS:       Current Outpatient Medications:     Aspirin 81 MG Oral Cap, Take 2 tablets by mouth daily., Disp: 90 capsule, Rfl: 2    Blood Glucose Monitoring Suppl (ONETOUCH ULTRA 2) w/Device Does not apply Kit, Fasting, 2 hours after breakfast, 2 hours after lunch, and 2 hours after dinner  Please substitute what is on formulary, Disp: 1 kit, Rfl: 0    Glucose Blood (ONETOUCH ULTRA) In Vitro Strip, Use strip to test blood glucose 4 times a day as directed. Please substitute what is on formulary, Disp: 200 strip, Rfl: 3    OneTouch UltraSoft Lancets Does not apply Misc, Finger stick route 4 (four) times daily.Please substitute what is on formulary, Disp: 200 each, Rfl: 2    ASPIRIN LOW DOSE 81 MG Oral Tab EC, Take 2 tablets (162 mg total) by mouth daily., Disp: , Rfl:     Ferrous Sulfate 325 (65 Fe) MG Oral Tab, Take 1 tablet (325 mg total) by mouth every other day., Disp: 90 tablet, Rfl: 3    Prenatal Vit-DSS-Fe Fum-FA (PRENATAL 19) Oral Tab, Take 1 tablet by mouth daily., Disp: 90 tablet, Rfl: 1    ALLERGIES:     Allergies[1]      REVIEW OF SYSTEMS:     Constitutional:    denies fever / chills  Cardiovascular:  denies chest pain or palpitations  Respiratory:    denies shortness of breath  Gastrointestinal:   denies severe abdominal pain, frequent diarrhea or constipation, nausea / vomiting  Genitourinary:    denies dysuria, bothersome incontinence  Skin/Breast:   denies any breast pain, lumps, or discharge  Neurological:    denies frequent severe headaches  Psychiatric:   denies depression or anxiety, thoughts of harming self or others      PHYSICAL EXAM:   Blood pressure 101/66, pulse 101, weight 155 lb (70.3 kg), last menstrual period 2024.  Constitutional:  well developed, well nourished, no distress  Abdomen:   soft, gravid, nontender  Musculoskeletal: no cva tenderness bilaterally  Skin/Hair:  no unusual rashes or bruises  Extremities:  no edema, no cyanosis, non tender bilaterally  Psychiatric:   oriented to time, place, person and situation. Appropriate mood and affect      ASSESSMENT & PLAN:     Mechelle was seen today for prenatal care.    Diagnoses and all orders for this visit:    Diet controlled gestational diabetes mellitus (GDM) in third trimester (HCC)      Bs normal.   Fetal kick counts discussed with  patient, labor precautions given.  Prenatal checklist third trimester counseling has been completed. I spent 33 minutes face-to-face with the patient, over half of the time in discussion and counseling of the below factors:  -Anesthesia/analgesia plans.  IV pain relief drugs d/w pt and that substantial relief of labor pain is generally not achieved with this method, and that these drugs can cross the placenta which can decreased fetal heart rate variability in utero and increased  respiratory depression and neurobehavioral changes. Up to two-thirds of women report moderate or severe pain one or two hours after administration. Epidural may be initiated at any stage of labor and can be maintained by rebolus. Efficacy dw pt and hypotension prevention with fluid bolus d/w pt  -Circumcision. D/w pt that we can most likely perform in the hospital before discharge. The rate of complications during and  in the first month is approximately 0.2% including bleeding, infection, urethral complications. The decision is optional and benefits from male circumcision include prevention of : urinary tract infections, acquisition of HIV, some STD's, and penile cancer.   -Breast of bottle feeding.  Breastfeeding is strongly recommended because of direct benefits to the infant's nutrition, GI function, host defense including against otitis media and pneumonia during the first few years of life, and psychological well-being. Moderate-quality evidence exists for prevention of type 1 DM, inflammatory bowel disease, and wheezing in young children.  -[place] education ( screening, jaundice, SIDS prevention, car seat, vit K, erythromycin, routine care) also d/w pt.  -Family medical leave or disability forms. Our office will be happy to assist in addressing these forms.  -GBBS. This bacterial normally colonizes the GI and genital tracts of 15 to 40% of pregnant women. Colonization is asymptomatic, but if left untreated in the event of a positve rectovaginal culture (done at 36 weeks plus) can cause infection in neonates and infants less than 90 days of age. If culture positive, the administration of antibiotics in labor is associated with a 69% reduction in early onset GBBS disease compared with no prevention strategy at all. GBBS in the urine or history of infant affected by early onset GBS disease are examples of automatic treatment prophylaxis during labor and the 36 week plus culture is not needed.   -Tdap vaccine.  Ideally give b/w 27 and 36 wks EGA in each pregnancy to help provide protection against pertussis.   -Postpartum family planning/contraception/tubal sterilization.  D/w pt and all questions answered  -Birth plan.  Birth plans will be reviewed with the patient if they elect to complete one. This is not a requirement. Support persons appear to have both psychological and medical benefits.   -Labor instruction info  sheet.  Labor signs and symptoms discussed with patient. Entry into hospital when labor ensues d/w pt. Some of the signs might be a gush or trickle of amniotic fluid, vaginal bleeding, regular contractions.   -Emergency blood transfusion. If deemed clinically necessary and with the patient's consent, she does accept blood transfusion. Risks and benefits of blood transfusion d/w pt:  -Fetal movement monitoring.  Patient instructed to either contact provider of come straight to labor and delivery without contacting the provider if she perceives a significant and persistant reduction in fetal movement and never to wait longer than two hours if there is absent fetal movement. If the patient is in doubt, she can come to hospital if she counts fewer than 10 kicks over two consecutive hours when lying on her side.  -Labor signs.  If the patient is experiencing uterine tightening (usually lasting 30-60 seconds) every 10 minutes or more she may contact the provider or come to labor and delivery without calling the provider. Patient instructed to also come to labor and delivery if vaginal bleeding or leakage of fluid vaginally.   -Signs and symptoms of pregnancy induced hypertension.  New onset of hypertension after 20 weeks gestation with or wihout protein in the urine are evaluated for preeclamsia. Symptoms include persistent headache, especially if not relieved by tylenol. Other visual symptoms include scotomata, photophobia, blurred vision, or temporary blindness. Upper abdominal or epigastric pain and altered mental status, or confusion are other symptoms.  -Postpartum depression. Patient asked to inform the provider is suspected. The disorder is suspected in women who manifest anxiety about the health of the infant, concern about their ability to care for the infant, despondency, lack of interest in the infant's activities, and substance abuse disorders.       FOLLOW-UP     No follow-ups on file.      Nikolay Gonzalez,  MD  1/22/2025         [1] No Known Allergies

## 2025-01-31 ENCOUNTER — TELEPHONE (OUTPATIENT)
Dept: PERINATAL CARE | Facility: HOSPITAL | Age: 35
End: 2025-01-31

## 2025-01-31 NOTE — TELEPHONE ENCOUNTER
Blood Glucose flow sheet  reviewed  through 1/30        Recommendations    Continue ADA diet    Blood glucose monitoring   With weekly review by ALISA

## 2025-02-07 ENCOUNTER — HOSPITAL ENCOUNTER (OUTPATIENT)
Dept: PERINATAL CARE | Facility: HOSPITAL | Age: 35
Discharge: HOME | End: 2025-02-07
Attending: OBSTETRICS & GYNECOLOGY
Payer: COMMERCIAL

## 2025-02-07 VITALS
HEART RATE: 61 BPM | WEIGHT: 155 LBS | DIASTOLIC BLOOD PRESSURE: 48 MMHG | SYSTOLIC BLOOD PRESSURE: 87 MMHG | BODY MASS INDEX: 35 KG/M2

## 2025-02-07 DIAGNOSIS — O24.410 DIET CONTROLLED GESTATIONAL DIABETES MELLITUS (GDM) IN SECOND TRIMESTER (HCC): Primary | ICD-10-CM

## 2025-02-07 DIAGNOSIS — O09.523 AMA (ADVANCED MATERNAL AGE) MULTIGRAVIDA 35+, THIRD TRIMESTER (HCC): ICD-10-CM

## 2025-02-07 DIAGNOSIS — E66.811 OBESITY (BMI 30.0-34.9): ICD-10-CM

## 2025-02-07 DIAGNOSIS — O24.410 DIET CONTROLLED GESTATIONAL DIABETES MELLITUS (GDM) IN SECOND TRIMESTER (HCC): ICD-10-CM

## 2025-02-07 PROCEDURE — 76819 FETAL BIOPHYS PROFIL W/O NST: CPT

## 2025-02-07 PROCEDURE — 76816 OB US FOLLOW-UP PER FETUS: CPT | Performed by: OBSTETRICS & GYNECOLOGY

## 2025-02-07 NOTE — PROGRESS NOTES
Outpatient Maternal-Fetal Medicine Follow-Up     Dear Dr. Gonzalez,     Thank you for requesting ultrasound evaluation and maternal fetal medicine consultation on your patient Mechelle Wise.  As you are aware she is a 34/35 year old female  with a Kaur pregnancy and an Estimated Date of Delivery: 4/3/25.  She returned to maternal-fetal medicine today for a follow-up visit.  Her history was reviewed from her prior visit and there were no interval changes.     Antepartum Risk Factors  Advanced maternal age  Class I obesity  Early gestational diabetes diagnosis, diet-controlled    S: Reviewed her diet and she is doing well.  She reports good fetal movement.  We reviewed that she is testing her blood sugar fasting and then 2 hours after her breakfast, 2 hours after lunch, and 2 hours after dinner.    PHYSICAL EXAMINATION:  BP (!) 87/48   Pulse 61   Wt 155 lb (70.3 kg)   LMP 2024 (Exact Date)   BMI 34.75 kg/m²   General: alert and oriented, no acute distress  Abdomen: gravid, soft, non-tender  Extremities: non-tender, no edema     OBSTETRIC ULTRASOUND  The patient had a follow-up fetal ultrasound today which I interpreted the results and reviewed them with the patient.    Ultrasound Findings:  Single IUP in cephalic presentation.    Placenta is posterior.   A 3 vessel cord is noted.  Cardiac activity is present at 156 bpm  EFW 2257 g ( 5 lb 0 oz); 70%.  AC 97%  CHARU is  11.2 cm.  MVP is 5.6 cm  BPP is 8/8.     The fetal measurements are consistent with established EDC. No gross ultrasound evidence of structural abnormalities are seen today. The patient understands that ultrasound cannot rule out all structural and chromosomal abnormalities.          See imaging tab for the complete US report.           DISCUSSION  During her visit we discussed and reviewed the following issues:  OBESITY:  Her BMI prior to pregnancy was 31.5  Obesity during pregnancy is associated with numerous maternal and   risks which were discussed previously and reviewed.  See MFM note from 2024 for detailed review.      ADVANCED MATERNAL AGE -discussed previously and reviewed.  See MFM note from 2024 for detailed review    I reviewed with the patient that pregnancies in women of advanced maternal age (35 or older at delivery) are associated with elevated risks. Specifically, there is a higher rate of:  Fetal malformations  Preeclampsia  Gestational diabetes  Intrauterine fetal death    As a result, enhanced pregnancy surveillance is advised for these patients including a comprehensive ultrasound to assess for fetal malformations (at 20 weeks) and a third trimester ultrasound assessment for fetal growth (at 32 weeks). In addition, weekly NST's (initiating at 36 weeks gestation for women 35-39 years and at 32 weeks gestation for women 40 years and older) are also advised. Routine obstetric care is more than adequate to assess for gestational diabetes and preeclampsia; hence, no further significant alterations in obstetric care are advised.    GESTATIONAL DIABETES -follow-up    3 hour GTT  Lab Results   Component Value Date    GLUFG 85 10/26/2024    GLU1G 209 (H) 10/26/2024    GLU2G 171 (H) 10/26/2024    GLU3G 132 10/26/2024       We reviewed the the potential implications and risks associated with GDM to her and her fetus, especially when poorly controlled. We discussed the increased incidence of macrosomia and the potential for related birth injury to her and her baby. We talked about the increase risks associated risk of fetal hyperinsulinemia, jaundice, electrolyte imbalance, seizure activity, IUFD and other adverse obstetric outcomes. We also reviewed her  increased risk of having diabetes later in life. The importance of good glycemic control and avoidance of prolonged hypoglycemia and hyperglycemia was addressed.  See MFM note from 2024 for detailed review    Blood sugars were reviewed, her  compliance is good.    The patient is presently on diet therapy; her compliance with her diabetic diet regimen was reviewed and it is good.    Based on the above discussion, should the need for improved glycemic control arise, Mechelle would prefer to start on insulin.      Medical Regimen Recommendation:   Continue ADA diet & BS monitoring  She was instructed to send her blood sugars to [facility] weekly for review.      We discussed the recommended plan of care based on her  risk factors.  Mechelle had her questions answered to her satisfaction.   ID# [id number]      IMPRESSION:  IUP at 32w1d  Normal fetal growth & BPP  Advanced maternal age, low risk cell free DNA screen.  Testing appropriately declined  Early diagnosis of gestational diabetes -diet controlled  Class I obesity complicating pregnancy    RECOMMENDATIONS:  Continue care with Dr. Gonzalez  Check glucoses 4x/day and send to [facility] weekly for review  Monthly growth US with addition of a BPP with each growth assessment at or beyond 32 weeks  Weekly NST at 36 weeks however if she requires medication for diabetes her testing should be increased to the following:  weekly NST at  32 wks / twice weekly at  34  wks      Hgb A1C q trimester  Baseline urine P/C ratio   EKG     Aspirin 81-120mg daily   Delivery for well controlled pre-existing diabetes without other complication 39-39w6d  Delivery for poorly controlled pre-existing diabetes with other complications 36-38w6d.    Total time spent 30 minutes this calendar day which includes preparing to see the patient including chart review, obtaining and/or reviewing additional medical history, performing a physical exam and evaluation, documenting clinical information in the electronic medical record, independently interpreting results, counseling the patient, communicating results to the patient/family/caregiver and coordinating care.     Case discussed with patient who demonstrated understanding and agreement with  plan.     Thank you for allowing me to participate in the care of this patient.  Please feel free to contact me with any questions.    Swapna Brown MD  Maternal-Fetal Medicine       Note to patient and family:  The 21st Century Cures Act makes medical notes available to patients in the interest of transparency.  However, please be advised that this is a medical document.  It is intended as a peer to peer communication.  It is written in medical language and may contain abbreviations or verbiage that are technical and unfamiliar.  It may appear blunt or direct.  Medical documents are intended to carry relevant information, facts as evident, and the clinical opinion of the practitioner.

## 2025-02-10 ENCOUNTER — ROUTINE PRENATAL (OUTPATIENT)
Dept: OBGYN CLINIC | Facility: CLINIC | Age: 35
End: 2025-02-10

## 2025-02-10 VITALS
WEIGHT: 154 LBS | HEART RATE: 82 BPM | SYSTOLIC BLOOD PRESSURE: 99 MMHG | BODY MASS INDEX: 35 KG/M2 | DIASTOLIC BLOOD PRESSURE: 63 MMHG

## 2025-02-10 DIAGNOSIS — O24.410 DIET CONTROLLED GESTATIONAL DIABETES MELLITUS (GDM) IN THIRD TRIMESTER (HCC): Primary | ICD-10-CM

## 2025-02-10 PROCEDURE — 99212 OFFICE O/P EST SF 10 MIN: CPT | Performed by: OBSTETRICS & GYNECOLOGY

## 2025-02-10 NOTE — PROGRESS NOTES
Mechelle Wise is a 35 year old female  Patient's last menstrual period was 2024 (exact date).   Chief Complaint   Patient presents with    Prenatal Care     Pt here for prenatal visit       OBSTETRICS HISTORY:     OB History    Para Term  AB Living   5 4 4     4   SAB IAB Ectopic Multiple Live Births           4      # Outcome Date GA Lbr Andrew/2nd Weight Sex Type Anes PTL Lv   5 Current            4 Term 10/11/15 40w0d  10 lb (4.536 kg) M NORMAL SPONT   TERRIE   3 Term 11    M NORMAL SPONT   TERRIE   2 Term 08    M NORMAL SPONT   TERRIE   1 Term 06    F NORMAL SPONT   TERRIE       GYNE HISTORY:         Period Cycle (Days): 28 (10/9/2024  1:35 PM)  Period Duration (Days): 3 (10/9/2024  1:35 PM)  Period Flow: moderate (10/9/2024  1:35 PM)  Use of Birth Control (if yes, specify type): None (10/9/2024  1:35 PM)  Hx Prior Abnormal Pap: No (10/9/2024  1:35 PM)        Latest Ref Rng & Units 2023     3:27 PM   RECENT PAP RESULTS   INTERPRETATION/RESULT: Negative for intraepithelial lesion or malignancy Negative for intraepithelial lesion or malignancy          MEDICAL HISTORY:     Past Medical History:    BMI 31.0-31.9,adult    Renal stones       SURGICAL HISTORY:     History reviewed. No pertinent surgical history.    SOCIAL HISTORY:     Social History     Socioeconomic History    Marital status: Single   Tobacco Use    Smoking status: Never     Passive exposure: Never    Smokeless tobacco: Never   Vaping Use    Vaping status: Never Used   Substance and Sexual Activity    Alcohol use: Never    Drug use: Never     Social Drivers of Health     Food Insecurity: No Food Insecurity (2024)    Received from [facility]    Food Insecurity     Within the past 30 days, I worried whether my food would run out before I got money to buy more. / En los últimos 30 días, me preocupó que la comida se podía acabar antes de tener dinero para compr...: Never true / Nunca      Within the past 30 days, the food that I bought just didn't last, and I didn't have money to get more. / En los últimos 30 días, La comida que compré no rindió lo suficiente, y no tenía dinero para...: Never true / Nunca   Housing Stability: Unknown (8/23/2024)    Received from [facility]    Housing Stability Vital Sign     Unable to Pay for Housing in the Last Year: No     Homeless in the Last Year: No        FAMILY HISTORY:     History reviewed. No pertinent family history.    MEDICATIONS:       Current Outpatient Medications:     Aspirin 81 MG Oral Cap, Take 2 tablets by mouth daily., Disp: 90 capsule, Rfl: 2    Blood Glucose Monitoring Suppl (ONETOUCH ULTRA 2) w/Device Does not apply Kit, Fasting, 2 hours after breakfast, 2 hours after lunch, and 2 hours after dinner  Please substitute what is on formulary, Disp: 1 kit, Rfl: 0    Glucose Blood (ONETOUCH ULTRA) In Vitro Strip, Use strip to test blood glucose 4 times a day as directed. Please substitute what is on formulary, Disp: 200 strip, Rfl: 3    OneTouch UltraSoft Lancets Does not apply Misc, Finger stick route 4 (four) times daily.Please substitute what is on formulary, Disp: 200 each, Rfl: 2    ASPIRIN LOW DOSE 81 MG Oral Tab EC, Take 2 tablets (162 mg total) by mouth daily., Disp: , Rfl:     Ferrous Sulfate 325 (65 Fe) MG Oral Tab, Take 1 tablet (325 mg total) by mouth every other day., Disp: 90 tablet, Rfl: 3    Prenatal Vit-DSS-Fe Fum-FA (PRENATAL 19) Oral Tab, Take 1 tablet by mouth daily., Disp: 90 tablet, Rfl: 1    ALLERGIES:     Allergies[1]      REVIEW OF SYSTEMS:     Constitutional:    denies fever / chills  Cardiovascular:  denies chest pain or palpitations  Respiratory:    denies shortness of breath  Gastrointestinal:  denies severe abdominal pain, frequent diarrhea or constipation, nausea / vomiting  Genitourinary:    denies dysuria, bothersome incontinence  Skin/Breast:   denies any breast pain, lumps, or  discharge  Neurological:    denies frequent severe headaches  Psychiatric:   denies depression or anxiety, thoughts of harming self or others      PHYSICAL EXAM:   Blood pressure 99/63, pulse 82, weight 154 lb (69.9 kg), last menstrual period 06/27/2024.  Constitutional:  well developed, well nourished, no distress  Abdomen:   soft, gravid, nontender  Musculoskeletal: no cva tenderness bilaterally  Skin/Hair:  no unusual rashes or bruises  Extremities:  no edema, no cyanosis, non tender bilaterally  Psychiatric:   oriented to time, place, person and situation. Appropriate mood and affect      ASSESSMENT & PLAN:     Mechelle was seen today for prenatal care.    Diagnoses and all orders for this visit:    Diet controlled gestational diabetes mellitus (GDM) in third trimester (HCC)    Fetal kick counts discussed with  patient, labor precautions given.  Bs reviewed in mychart and wnl.  Fetal kick counts discussed with  patient, labor precautions given.  Recent A1c normal      FOLLOW-UP     No follow-ups on file.      Nikolay Gonzalez MD  2/10/2025         [1] No Known Allergies

## 2025-02-13 ENCOUNTER — TELEPHONE (OUTPATIENT)
Dept: PERINATAL CARE | Facility: HOSPITAL | Age: 35
End: 2025-02-13

## 2025-02-17 ENCOUNTER — TELEPHONE (OUTPATIENT)
Dept: PERINATAL CARE | Facility: HOSPITAL | Age: 35
End: 2025-02-17

## 2025-02-19 ENCOUNTER — ROUTINE PRENATAL (OUTPATIENT)
Dept: OBGYN CLINIC | Facility: CLINIC | Age: 35
End: 2025-02-19

## 2025-02-19 ENCOUNTER — TELEPHONE (OUTPATIENT)
Dept: PERINATAL CARE | Facility: HOSPITAL | Age: 35
End: 2025-02-19

## 2025-02-19 VITALS
WEIGHT: 155 LBS | HEART RATE: 84 BPM | SYSTOLIC BLOOD PRESSURE: 96 MMHG | DIASTOLIC BLOOD PRESSURE: 65 MMHG | BODY MASS INDEX: 35 KG/M2

## 2025-02-19 DIAGNOSIS — O24.410 DIET CONTROLLED GESTATIONAL DIABETES MELLITUS (GDM) IN THIRD TRIMESTER (HCC): Primary | ICD-10-CM

## 2025-02-19 PROCEDURE — 99212 OFFICE O/P EST SF 10 MIN: CPT | Performed by: OBSTETRICS & GYNECOLOGY

## 2025-02-19 NOTE — TELEPHONE ENCOUNTER
Blood Glucose flow sheet  reviewed  through 2/17/25        Recommendations    Continue diabetic diet    Blood glucose monitoring   With weekly review by ALISA

## 2025-02-19 NOTE — PROGRESS NOTES
Mechelle Wise is a 35 year old female  Patient's last menstrual period was 2024 (exact date).   Chief Complaint   Patient presents with    Prenatal Care       OBSTETRICS HISTORY:     OB History    Para Term  AB Living   5 4 4     4   SAB IAB Ectopic Multiple Live Births           4      # Outcome Date GA Lbr Andrew/2nd Weight Sex Type Anes PTL Lv   5 Current            4 Term 10/11/15 40w0d  10 lb (4.536 kg) M NORMAL SPONT   TERRIE   3 Term 11    M NORMAL SPONT   TERRIE   2 Term 08    M NORMAL SPONT   TERRIE   1 Term 06    F NORMAL SPONT   TERRIE       GYNE HISTORY:         Period Cycle (Days): 28 (10/9/2024  1:35 PM)  Period Duration (Days): 3 (10/9/2024  1:35 PM)  Period Flow: moderate (10/9/2024  1:35 PM)  Use of Birth Control (if yes, specify type): None (10/9/2024  1:35 PM)  Hx Prior Abnormal Pap: No (10/9/2024  1:35 PM)        Latest Ref Rng & Units 2023     3:27 PM   RECENT PAP RESULTS   INTERPRETATION/RESULT: Negative for intraepithelial lesion or malignancy Negative for intraepithelial lesion or malignancy          MEDICAL HISTORY:     Past Medical History:    BMI 31.0-31.9,adult    Renal stones       SURGICAL HISTORY:     History reviewed. No pertinent surgical history.    SOCIAL HISTORY:     Social History     Socioeconomic History    Marital status: Single   Tobacco Use    Smoking status: Never     Passive exposure: Never    Smokeless tobacco: Never   Vaping Use    Vaping status: Never Used   Substance and Sexual Activity    Alcohol use: Never    Drug use: Never     Social Drivers of Health     Food Insecurity: No Food Insecurity (2024)    Received from [facility]    Food Insecurity     Within the past 30 days, I worried whether my food would run out before I got money to buy more. / En los últimos 30 días, me preocupó que la comida se podía acabar antes de tener dinero para compr...: Never true / Nunca     Within the past 30 days, the food  that I bought just didn't last, and I didn't have money to get more. / En los últimos 30 días, La comida que compré no rindió lo suficiente, y no tenía dinero para...: Never true / Nunca   Housing Stability: Unknown (8/23/2024)    Received from [facility]    Housing Stability Vital Sign     Unable to Pay for Housing in the Last Year: No     Homeless in the Last Year: No        FAMILY HISTORY:     History reviewed. No pertinent family history.    MEDICATIONS:       Current Outpatient Medications:     ASPIRIN LOW DOSE 81 MG Oral Tab EC, Take 2 tablets (162 mg total) by mouth daily., Disp: , Rfl:     Ferrous Sulfate 325 (65 Fe) MG Oral Tab, Take 1 tablet (325 mg total) by mouth every other day., Disp: 90 tablet, Rfl: 3    Prenatal Vit-DSS-Fe Fum-FA (PRENATAL 19) Oral Tab, Take 1 tablet by mouth daily., Disp: 90 tablet, Rfl: 1    Blood Glucose Monitoring Suppl (ONETOUCH ULTRA 2) w/Device Does not apply Kit, Fasting, 2 hours after breakfast, 2 hours after lunch, and 2 hours after dinner  Please substitute what is on formulary (Patient not taking: Reported on 2/19/2025), Disp: 1 kit, Rfl: 0    Glucose Blood (ONETOUCH ULTRA) In Vitro Strip, Use strip to test blood glucose 4 times a day as directed. Please substitute what is on formulary (Patient not taking: Reported on 2/19/2025), Disp: 200 strip, Rfl: 3    OneTouch UltraSoft Lancets Does not apply Misc, Finger stick route 4 (four) times daily.Please substitute what is on formulary (Patient not taking: Reported on 2/19/2025), Disp: 200 each, Rfl: 2    ALLERGIES:     Allergies[1]      REVIEW OF SYSTEMS:     Constitutional:    denies fever / chills  Cardiovascular:  denies chest pain or palpitations  Respiratory:    denies shortness of breath  Gastrointestinal:  denies severe abdominal pain, frequent diarrhea or constipation, nausea / vomiting  Genitourinary:    denies dysuria, bothersome incontinence  Skin/Breast:   denies any breast pain, lumps, or  discharge  Neurological:    denies frequent severe headaches  Psychiatric:   denies depression or anxiety, thoughts of harming self or others      PHYSICAL EXAM:   Blood pressure 96/65, pulse 84, weight 155 lb (70.3 kg), last menstrual period 06/27/2024.  Constitutional:  well developed, well nourished, no distress  Abdomen:   soft, gravid, nontender  Musculoskeletal: no cva tenderness bilaterally  Skin/Hair:  no unusual rashes or bruises  Extremities:  no edema, no cyanosis, non tender bilaterally  Psychiatric:   oriented to time, place, person and situation. Appropriate mood and affect      ASSESSMENT & PLAN:     Mechelle was seen today for prenatal care.    Diagnoses and all orders for this visit:    Diet controlled gestational diabetes mellitus (GDM) in third trimester (HCC)      Bs reviewed and wnl. Cont diet  Fetal kick counts discussed with  patient, labor precautions given.      FOLLOW-UP     No follow-ups on file.      Nikolay Gonzalez MD  2/19/2025         [1] No Known Allergies

## 2025-02-26 ENCOUNTER — TELEPHONE (OUTPATIENT)
Dept: PERINATAL CARE | Facility: HOSPITAL | Age: 35
End: 2025-02-26

## 2025-02-27 ENCOUNTER — TELEPHONE (OUTPATIENT)
Dept: OBGYN CLINIC | Facility: CLINIC | Age: 35
End: 2025-02-27

## 2025-02-28 ENCOUNTER — TELEPHONE (OUTPATIENT)
Dept: PERINATAL CARE | Facility: HOSPITAL | Age: 35
End: 2025-02-28

## 2025-03-03 ENCOUNTER — TELEPHONE (OUTPATIENT)
Dept: PERINATAL CARE | Facility: HOSPITAL | Age: 35
End: 2025-03-03

## 2025-03-03 NOTE — TELEPHONE ENCOUNTER
Blood Glucose flow sheet  reviewed  through 3/2/25        Recommendations     Continue diabetic diet     Blood glucose monitoring   With weekly review by ALISA

## 2025-03-04 NOTE — PROGRESS NOTES
Outpatient Maternal-Fetal Medicine Follow-Up     Dear Dr. Gonzalez,     Thank you for requesting ultrasound evaluation and maternal fetal medicine consultation on your patient Mechelle Wise.  As you are aware she is a 34/35 year old female  with a Kaur pregnancy and an Estimated Date of Delivery: 4/3/25.  She returned to maternal-fetal medicine today for a follow-up visit.  Her history was reviewed from her prior visit and there were no interval changes.     Antepartum Risk Factors  Advanced maternal age  Class I obesity  Early gestational diabetes diagnosis, diet-controlled    S: Reviewed her diet and she is doing well.  She reports good fetal movement.   We discussed that the fasting blood sugars are trending to be 95-98 greater than 50% of the time.    Metformin was discussed and side effects reviewed.  She is agreeable to starting on metformin with her bedtime snack    PHYSICAL EXAMINATION:  BP 98/61   Pulse 89   Wt 160 lb (72.6 kg)   LMP 2024 (Exact Date)   BMI 35.87 kg/m²   General: alert and oriented, no acute distress  Abdomen: gravid, soft, non-tender  Extremities: non-tender, no edema     OBSTETRIC ULTRASOUND  The patient had a follow-up fetal ultrasound today which I interpreted the results and reviewed them with the patient.    Ultrasound Findings:  Single IUP in cephalic presentation.    Placenta is posterior.   A 3 vessel cord is noted.  Cardiac activity is present at 159 bpm  EFW 3245 g ( 7 lb 2 oz); 77%.  AC> 99%  Borderline increased CHARU is  22.7 cm.  MVP is 7.6 cm  BPP is 8/8.     The fetal measurements are consistent with established EDC. No gross ultrasound evidence of structural abnormalities are seen today. The patient understands that ultrasound cannot rule out all structural and chromosomal abnormalities.        See imaging tab for the complete US report.       DISCUSSION  During her visit we discussed and reviewed the following issues:  OBESITY:  Her BMI prior to  pregnancy was 31.5  Obesity during pregnancy is associated with numerous maternal and  risks which were discussed previously and reviewed.  See M note from 2024 for detailed review.      ADVANCED MATERNAL AGE -discussed previously and reviewed.  See [facility] note from 2024 for detailed review    I reviewed with the patient that pregnancies in women of advanced maternal age (35 or older at delivery) are associated with elevated risks. Specifically, there is a higher rate of:  Fetal malformations  Preeclampsia  Gestational diabetes  Intrauterine fetal death    As a result, enhanced pregnancy surveillance is advised for these patients including a comprehensive ultrasound to assess for fetal malformations (at 20 weeks) and a third trimester ultrasound assessment for fetal growth (at 32 weeks). In addition, weekly NST's (initiating at 36 weeks gestation for women 35-39 years and at 32 weeks gestation for women 40 years and older) are also advised. Routine obstetric care is more than adequate to assess for gestational diabetes and preeclampsia; hence, no further significant alterations in obstetric care are advised.    GESTATIONAL DIABETES -follow-up    3 hour GTT  Lab Results   Component Value Date    GLUFG 85 10/26/2024    GLU1G 209 (H) 10/26/2024    GLU2G 171 (H) 10/26/2024    GLU3G 132 10/26/2024       We reviewed the the potential implications and risks associated with GDM to her and her fetus, especially when poorly controlled. We discussed the increased incidence of macrosomia and the potential for related birth injury to her and her baby. We talked about the increase risks associated risk of fetal hyperinsulinemia, jaundice, electrolyte imbalance, seizure activity, IUFD and other adverse obstetric outcomes. We also reviewed her  increased risk of having diabetes later in life. The importance of good glycemic control and avoidance of prolonged hypoglycemia and hyperglycemia was addressed.  See M  note from 2024 for detailed review    Blood sugars were reviewed, her compliance is good.    The patient is presently on diet therapy; her compliance with her diabetic diet regimen was reviewed and it is good.  Based on the above discussion, and the need for better fasting blood sugars, we mutually agreed to begin metformin 500 mg nightly.      Medical Regimen Recommendation:   Continue ADA diet & BS monitoring  She was instructed to send her blood sugars to [facility] weekly for review.  Metformin 500 mg 20 minutes before her bedtime snack      We discussed the recommended plan of care based on her  risk factors.  Mechelle had her questions answered to her satisfaction.   ID# [id number]      IMPRESSION:  IUP at 36w0d  Normal fetal growth (AC >99%ile) & BPP  Advanced maternal age, low risk cell free DNA screen.  Testing appropriately declined  Early diagnosis of gestational diabetes -beginning metformin today  Class I obesity complicating pregnancy  History of macrosomic infant    RECOMMENDATIONS:  Continue care with Dr. Gonzalez  Check glucoses 4x/day and send to [facility] weekly for review  Metformin 500 mg before bedtime snack  Weekly NST at 36 weeks   Aspirin 81-120mg daily   Delivery for well controlled pre-existing diabetes without other complication 38-39w    Total time spent 40 minutes this calendar day which includes preparing to see the patient including chart review, obtaining and/or reviewing additional medical history, performing a physical exam and evaluation, documenting clinical information in the electronic medical record, independently interpreting results, counseling the patient, communicating results to the patient/family/caregiver and coordinating care.     Case discussed with patient who demonstrated understanding and agreement with plan.     Thank you for allowing me to participate in the care of this patient.  Please feel free to contact me with any questions.    Swapna Brown,  MD  Maternal-Fetal Medicine       Note to patient and family:  The 21st Century Cures Act makes medical notes available to patients in the interest of transparency.  However, please be advised that this is a medical document.  It is intended as a peer to peer communication.  It is written in medical language and may contain abbreviations or verbiage that are technical and unfamiliar.  It may appear blunt or direct.  Medical documents are intended to carry relevant information, facts as evident, and the clinical opinion of the practitioner.

## 2025-03-06 ENCOUNTER — HOSPITAL ENCOUNTER (OUTPATIENT)
Dept: PERINATAL CARE | Facility: HOSPITAL | Age: 35
Discharge: HOME | End: 2025-03-06
Attending: OBSTETRICS & GYNECOLOGY
Payer: COMMERCIAL

## 2025-03-06 ENCOUNTER — TELEPHONE (OUTPATIENT)
Dept: OBGYN CLINIC | Facility: CLINIC | Age: 35
End: 2025-03-06

## 2025-03-06 VITALS
SYSTOLIC BLOOD PRESSURE: 98 MMHG | HEART RATE: 89 BPM | DIASTOLIC BLOOD PRESSURE: 61 MMHG | BODY MASS INDEX: 36 KG/M2 | WEIGHT: 160 LBS

## 2025-03-06 DIAGNOSIS — O09.522 SUPERVISION OF ELDERLY MULTIGRAVIDA IN SECOND TRIMESTER (HCC): ICD-10-CM

## 2025-03-06 DIAGNOSIS — E66.811 OBESITY (BMI 30.0-34.9): Primary | ICD-10-CM

## 2025-03-06 DIAGNOSIS — O24.415 GESTATIONAL DIABETES MELLITUS (GDM) IN THIRD TRIMESTER CONTROLLED ON ORAL HYPOGLYCEMIC DRUG (HCC): ICD-10-CM

## 2025-03-06 DIAGNOSIS — E66.811 OBESITY (BMI 30.0-34.9): ICD-10-CM

## 2025-03-06 DIAGNOSIS — O24.410 DIET CONTROLLED GESTATIONAL DIABETES MELLITUS (GDM) IN THIRD TRIMESTER (HCC): ICD-10-CM

## 2025-03-06 PROCEDURE — 76819 FETAL BIOPHYS PROFIL W/O NST: CPT

## 2025-03-06 PROCEDURE — 76816 OB US FOLLOW-UP PER FETUS: CPT | Performed by: OBSTETRICS & GYNECOLOGY

## 2025-03-06 NOTE — TELEPHONE ENCOUNTER
Disability, Family Medical Leave Act received in Forms Dept, logged for processing, valid [employer] authorization for Disability and Release of Information for [employer].  Scanned [employer]

## 2025-03-06 NOTE — TELEPHONE ENCOUNTER
I tried calling the patient using the language line but there is no voicemail box set up, unable to leave a message

## 2025-03-06 NOTE — TELEPHONE ENCOUNTER
Please schedule the following surgery:    Procedure: induction labor  Dr. Gonzalez  Assist: (Y/N or none)   Date: 3/23 in AM  Dx:gdma2  Pre-op appt: (Y/N or n/a)   Admission type: (IN/OUT/OBVS)   Department of discharge(SDS/Floor):   Expected length of stay:   Procedure length time (please enter amount you are requesting):   Recovery time (patients always ask):   Medical Clearance: (Y/N)   Special Requests:     Surgical prophylaxis:        ALL Medicaid/including BCBS community: Tubal/ Hyst form MUST be signed (30 days):     Message to nurses:

## 2025-03-10 ENCOUNTER — TELEPHONE (OUTPATIENT)
Dept: PERINATAL CARE | Facility: HOSPITAL | Age: 35
End: 2025-03-10

## 2025-03-10 ENCOUNTER — HOSPITAL ENCOUNTER (OUTPATIENT)
Dept: PERINATAL CARE | Facility: HOSPITAL | Age: 35
End: 2025-03-10
Attending: OBSTETRICS & GYNECOLOGY
Payer: COMMERCIAL

## 2025-03-10 VITALS — SYSTOLIC BLOOD PRESSURE: 101 MMHG | HEART RATE: 95 BPM | DIASTOLIC BLOOD PRESSURE: 65 MMHG

## 2025-03-10 DIAGNOSIS — O09.522 SUPERVISION OF ELDERLY MULTIGRAVIDA IN SECOND TRIMESTER (HCC): ICD-10-CM

## 2025-03-10 DIAGNOSIS — O24.410 DIET CONTROLLED GESTATIONAL DIABETES MELLITUS (GDM) IN THIRD TRIMESTER (HCC): ICD-10-CM

## 2025-03-10 PROCEDURE — 59025 FETAL NON-STRESS TEST: CPT | Performed by: OBSTETRICS & GYNECOLOGY

## 2025-03-10 PROCEDURE — 59025 FETAL NON-STRESS TEST: CPT

## 2025-03-10 NOTE — TELEPHONE ENCOUNTER
Blood Glucose flow sheet  reviewed  through 3/9/25        Recommendations  Metformin 500 mg 20 minutes before her bedtime snack     Blood glucose monitoring   With weekly review by ALISA

## 2025-03-10 NOTE — ADDENDUM NOTE
Encounter addended by: Sanabria, Corinne C, RN on: 3/10/2025 3:14 PM   Actions taken: Flowsheet accepted

## 2025-03-15 ENCOUNTER — NST DOCUMENTATION (OUTPATIENT)
Dept: OBGYN CLINIC | Facility: CLINIC | Age: 35
End: 2025-03-15

## 2025-03-15 DIAGNOSIS — O09.522 SUPERVISION OF ELDERLY MULTIGRAVIDA IN SECOND TRIMESTER (HCC): ICD-10-CM

## 2025-03-15 DIAGNOSIS — O24.410 DIET CONTROLLED GESTATIONAL DIABETES MELLITUS (GDM) IN THIRD TRIMESTER (HCC): Primary | ICD-10-CM

## 2025-03-15 NOTE — NST
Nonstress Test   Patient: Mechelle Wise    Gestation: 37w2d    Diagnosis from order:      ICD-10-CM    1. Diet controlled gestational diabetes mellitus (GDM) in third trimester ([facility]) [O24.410]  O24.410       2. Supervision of elderly multigravida in second trimester ([facility]) [O09.522]  O09.522             Problem List:   Patient Active Problem List   Diagnosis    Supervision of elderly multigravida in second trimester ([facility])    Obesity (BMI 30.0-34.9)    Diet controlled gestational diabetes mellitus (GDM) in third trimester ([facility])    BMI 31.0-31.9,adult       NST:        3/10/2025   NST DOCUMENTATION   Variability 6-25 BPM   Accelerations Yes   Decelerations None   Baseline 135 BPM   Uterine Irritability No   Contractions Irregular   Acoustic Stimulator No   Nonstress Test Interpretation Reactive   Nonstress Test Second Interpretation Reactive   NST Completed by АЛЕКСАНДР Vazquez   Disposition Home    Testing Plan Weekly NST   Provider Notified MD Mark         I agree with the above evaluation. NST completed.  Birgit Flores MD  3/15/2025  3:08 PM

## 2025-03-17 ENCOUNTER — TELEPHONE (OUTPATIENT)
Dept: PERINATAL CARE | Facility: HOSPITAL | Age: 35
End: 2025-03-17

## 2025-03-17 ENCOUNTER — HOSPITAL ENCOUNTER (OUTPATIENT)
Dept: PERINATAL CARE | Facility: HOSPITAL | Age: 35
End: 2025-03-17
Attending: OBSTETRICS & GYNECOLOGY
Payer: COMMERCIAL

## 2025-03-17 VITALS — HEART RATE: 80 BPM | DIASTOLIC BLOOD PRESSURE: 50 MMHG | SYSTOLIC BLOOD PRESSURE: 90 MMHG

## 2025-03-17 DIAGNOSIS — O24.410 DIET CONTROLLED GESTATIONAL DIABETES MELLITUS (GDM) IN THIRD TRIMESTER (HCC): ICD-10-CM

## 2025-03-17 DIAGNOSIS — O09.522 SUPERVISION OF ELDERLY MULTIGRAVIDA IN SECOND TRIMESTER (HCC): ICD-10-CM

## 2025-03-17 DIAGNOSIS — E66.811 OBESITY (BMI 30.0-34.9): ICD-10-CM

## 2025-03-17 PROCEDURE — 59025 FETAL NON-STRESS TEST: CPT

## 2025-03-17 NOTE — TELEPHONE ENCOUNTER
Dr. Gonzalez,    **2 Forms Require signature**    Please sign off on form if you agree to: Family Medical Leave Act and Disability due to pregnancy; MIKE 4/03/25 - 6wks vaginal, 8wks C-Sec    -Signature page will be the first page scanned  -From your Inbasket, Highlight the patient and click Chart   -Double click the 2/27/25 Forms Completion telephone encounter  -Scroll down to the Media section   -Click the blue Hyperlink: FMLA Dr Gonzalez 3/17/25 and Disab 1 Dr Gonzalez 3/18/25  -Click Acknowledge located in the top right ribbon/menu   -Drag the mouse into the blank space of the document and a + sign will appear. Left click to   electronically sign the document.  -Once signed, simply exit out of the screen and you signature will be saved.     Thank you for your time,      Genei

## 2025-03-19 ENCOUNTER — TELEPHONE (OUTPATIENT)
Dept: PERINATAL CARE | Facility: HOSPITAL | Age: 35
End: 2025-03-19

## 2025-03-20 ENCOUNTER — ROUTINE PRENATAL (OUTPATIENT)
Dept: OBGYN CLINIC | Facility: CLINIC | Age: 35
End: 2025-03-20

## 2025-03-20 VITALS
SYSTOLIC BLOOD PRESSURE: 93 MMHG | HEART RATE: 76 BPM | WEIGHT: 154 LBS | BODY MASS INDEX: 35 KG/M2 | DIASTOLIC BLOOD PRESSURE: 60 MMHG

## 2025-03-20 DIAGNOSIS — O24.414 INSULIN CONTROLLED GESTATIONAL DIABETES MELLITUS (GDM) IN THIRD TRIMESTER (HCC): Primary | ICD-10-CM

## 2025-03-20 PROBLEM — O24.410 DIET CONTROLLED GESTATIONAL DIABETES MELLITUS (GDM) IN THIRD TRIMESTER (HCC): Status: RESOLVED | Noted: 2024-11-06 | Resolved: 2025-03-20

## 2025-03-20 PROCEDURE — 99212 OFFICE O/P EST SF 10 MIN: CPT | Performed by: OBSTETRICS & GYNECOLOGY

## 2025-03-20 NOTE — PROGRESS NOTES
Mechelle Wise is a 35 year old female  Patient's last menstrual period was 2024 (exact date).   Chief Complaint   Patient presents with    Prenatal Care     Pt here for prenatal visit no complaints       OBSTETRICS HISTORY:     OB History    Para Term  AB Living   5 4 4     4   SAB IAB Ectopic Multiple Live Births           4      # Outcome Date GA Lbr Andrew/2nd Weight Sex Type Anes PTL Lv   5 Current            4 Term 10/11/15 40w0d  10 lb (4.536 kg) M NORMAL SPONT   TERRIE   3 Term 11    M NORMAL SPONT   TERRIE   2 Term 08    M NORMAL SPONT   TERRIE   1 Term 06    F NORMAL SPONT   TERRIE       GYNE HISTORY:         Period Cycle (Days): 28 (10/9/2024  1:35 PM)  Period Duration (Days): 3 (10/9/2024  1:35 PM)  Period Flow: moderate (10/9/2024  1:35 PM)  Use of Birth Control (if yes, specify type): None (10/9/2024  1:35 PM)  Hx Prior Abnormal Pap: No (10/9/2024  1:35 PM)        Latest Ref Rng & Units 2023     3:27 PM   RECENT PAP RESULTS   INTERPRETATION/RESULT: Negative for intraepithelial lesion or malignancy Negative for intraepithelial lesion or malignancy          MEDICAL HISTORY:     Past Medical History:    BMI 31.0-31.9,adult    Renal stones       SURGICAL HISTORY:     History reviewed. No pertinent surgical history.    SOCIAL HISTORY:     Social History     Socioeconomic History    Marital status: Single   Tobacco Use    Smoking status: Never     Passive exposure: Never    Smokeless tobacco: Never   Vaping Use    Vaping status: Never Used   Substance and Sexual Activity    Alcohol use: Never    Drug use: Never     Social Drivers of Health     Food Insecurity: No Food Insecurity (2024)    Received from [facility]    Food Insecurity     Within the past 30 days, I worried whether my food would run out before I got money to buy more. / En los últimos 30 días, me preocupó que la comida se podía acabar antes de tener dinero para compr...: Never true  / Nunca     Within the past 30 days, the food that I bought just didn't last, and I didn't have money to get more. / En los últimos 30 días, La comida que compré no rindió lo suficiente, y no tenía dinero para...: Never true / Nunca   Housing Stability: Unknown (8/23/2024)    Received from [facility]    Housing Stability Vital Sign     Unable to Pay for Housing in the Last Year: No     Homeless in the Last Year: No        FAMILY HISTORY:     History reviewed. No pertinent family history.    MEDICATIONS:       Current Outpatient Medications:     metFORMIN 500 MG Oral Tab, Take 1 tablet (500 mg total) by mouth at bedtime. Take 20 minutes before your bedtime snack, Disp: 30 tablet, Rfl: 0    Blood Glucose Monitoring Suppl (ONETOUCH ULTRA 2) w/Device Does not apply Kit, Fasting, 2 hours after breakfast, 2 hours after lunch, and 2 hours after dinner  Please substitute what is on formulary, Disp: 1 kit, Rfl: 0    Glucose Blood (ONETOUCH ULTRA) In Vitro Strip, Use strip to test blood glucose 4 times a day as directed. Please substitute what is on formulary, Disp: 200 strip, Rfl: 3    OneTouch UltraSoft Lancets Does not apply Misc, Finger stick route 4 (four) times daily.Please substitute what is on formulary, Disp: 200 each, Rfl: 2    Prenatal Vit-DSS-Fe Fum-FA (PRENATAL 19) Oral Tab, Take 1 tablet by mouth daily., Disp: 90 tablet, Rfl: 1    ASPIRIN LOW DOSE 81 MG Oral Tab EC, Take 2 tablets (162 mg total) by mouth daily. (Patient not taking: Reported on 3/20/2025), Disp: , Rfl:     Ferrous Sulfate 325 (65 Fe) MG Oral Tab, Take 1 tablet (325 mg total) by mouth every other day. (Patient not taking: Reported on 3/20/2025), Disp: 90 tablet, Rfl: 3    ALLERGIES:     Allergies[1]      REVIEW OF SYSTEMS:     Constitutional:    denies fever / chills  Cardiovascular:  denies chest pain or palpitations  Respiratory:    denies shortness of breath  Gastrointestinal:  denies severe abdominal pain, frequent  diarrhea or constipation, nausea / vomiting  Genitourinary:    denies dysuria, bothersome incontinence  Skin/Breast:   denies any breast pain, lumps, or discharge  Neurological:    denies frequent severe headaches  Psychiatric:   denies depression or anxiety, thoughts of harming self or others      PHYSICAL EXAM:   Blood pressure 93/60, pulse 76, weight 154 lb (69.9 kg), last menstrual period 06/27/2024.  Constitutional:  well developed, well nourished, no distress  Abdomen:   soft, gravid, nontender  Musculoskeletal: no cva tenderness bilaterally  Skin/Hair:  no unusual rashes or bruises  Extremities:  no edema, no cyanosis, non tender bilaterally  Psychiatric:   oriented to time, place, person and situation. Appropriate mood and affect      ASSESSMENT & PLAN:     Mechelle was seen today for prenatal care.    Diagnoses and all orders for this visit:    Insulin controlled gestational diabetes mellitus (GDM) in third trimester (HCC)  -     Group B Strep PCR; Future      Fetal kick counts discussed with  patient, labor precautions given.  Gbs done  Bs stable on metformin 500 daily    FOLLOW-UP     No follow-ups on file.      Nikolay Gonzalez MD  3/20/2025         [1] No Known Allergies

## 2025-03-21 ENCOUNTER — TELEPHONE (OUTPATIENT)
Dept: OBGYN UNIT | Facility: HOSPITAL | Age: 35
End: 2025-03-21

## 2025-03-22 LAB — GROUP B STREP BY PCR FOR PCR OVT: NEGATIVE

## 2025-03-23 ENCOUNTER — APPOINTMENT (OUTPATIENT)
Dept: OBGYN CLINIC | Facility: HOSPITAL | Age: 35
End: 2025-03-23
Attending: OBSTETRICS & GYNECOLOGY
Payer: COMMERCIAL

## 2025-03-23 ENCOUNTER — HOSPITAL ENCOUNTER (INPATIENT)
Facility: HOSPITAL | Age: 35
LOS: 2 days | Discharge: HOME | End: 2025-03-25
Attending: OBSTETRICS & GYNECOLOGY | Admitting: OBSTETRICS & GYNECOLOGY
Payer: COMMERCIAL

## 2025-03-23 PROBLEM — Z34.90 PREGNANCY (HCC): Status: ACTIVE | Noted: 2025-03-23

## 2025-03-23 LAB
ANTIBODY SCREEN: NEGATIVE
GLUCOSE BLDC GLUCOMTR-MCNC: 92 MG/DL (ref 70–99)
RH BLOOD TYPE: POSITIVE
T PALLIDUM AB SER QL IA: NONREACTIVE

## 2025-03-23 PROCEDURE — 3E033VJ INTRODUCTION OF OTHER HORMONE INTO PERIPHERAL VEIN, PERCUTANEOUS APPROACH: ICD-10-PCS | Performed by: OBSTETRICS & GYNECOLOGY

## 2025-03-23 PROCEDURE — 59409 OBSTETRICAL CARE: CPT | Performed by: OBSTETRICS & GYNECOLOGY

## 2025-03-23 RX ORDER — SODIUM CHLORIDE, SODIUM LACTATE, POTASSIUM CHLORIDE, CALCIUM CHLORIDE 600; 310; 30; 20 MG/100ML; MG/100ML; MG/100ML; MG/100ML
INJECTION, SOLUTION INTRAVENOUS AS NEEDED
Status: DISCONTINUED | OUTPATIENT
Start: 2025-03-23 | End: 2025-03-24 | Stop reason: HOSPADM

## 2025-03-23 RX ORDER — LIDOCAINE HYDROCHLORIDE 10 MG/ML
30 INJECTION, SOLUTION EPIDURAL; INFILTRATION; INTRACAUDAL; PERINEURAL ONCE
Status: DISCONTINUED | OUTPATIENT
Start: 2025-03-23 | End: 2025-03-24 | Stop reason: HOSPADM

## 2025-03-23 RX ORDER — DOCUSATE SODIUM 100 MG/1
100 CAPSULE, LIQUID FILLED ORAL 2 TIMES DAILY
Status: DISCONTINUED | OUTPATIENT
Start: 2025-03-24 | End: 2025-03-25

## 2025-03-23 RX ORDER — BISACODYL 10 MG
10 SUPPOSITORY, RECTAL RECTAL ONCE AS NEEDED
Status: DISCONTINUED | OUTPATIENT
Start: 2025-03-23 | End: 2025-03-25

## 2025-03-23 RX ORDER — IBUPROFEN 600 MG/1
600 TABLET, FILM COATED ORAL ONCE AS NEEDED
Status: COMPLETED | OUTPATIENT
Start: 2025-03-23 | End: 2025-03-24

## 2025-03-23 RX ORDER — TRANEXAMIC ACID 10 MG/ML
INJECTION, SOLUTION INTRAVENOUS
Status: COMPLETED
Start: 2025-03-23 | End: 2025-03-23

## 2025-03-23 RX ORDER — CITRIC ACID/SODIUM CITRATE 334-500MG
30 SOLUTION, ORAL ORAL AS NEEDED
Status: DISCONTINUED | OUTPATIENT
Start: 2025-03-23 | End: 2025-03-24 | Stop reason: HOSPADM

## 2025-03-23 RX ORDER — DEXTROSE, SODIUM CHLORIDE, SODIUM LACTATE, POTASSIUM CHLORIDE, AND CALCIUM CHLORIDE 5; .6; .31; .03; .02 G/100ML; G/100ML; G/100ML; G/100ML; G/100ML
INJECTION, SOLUTION INTRAVENOUS CONTINUOUS
Status: DISCONTINUED | OUTPATIENT
Start: 2025-03-23 | End: 2025-03-24 | Stop reason: HOSPADM

## 2025-03-23 RX ORDER — METHYLERGONOVINE MALEATE 0.2 MG/ML
INJECTION INTRAVENOUS
Status: COMPLETED
Start: 2025-03-23 | End: 2025-03-23

## 2025-03-23 RX ORDER — ACETAMINOPHEN 500 MG
500 TABLET ORAL EVERY 6 HOURS PRN
Status: DISCONTINUED | OUTPATIENT
Start: 2025-03-23 | End: 2025-03-23

## 2025-03-23 RX ORDER — AMMONIA 15 % (W/V)
0.3 AMPUL (EA) INHALATION AS NEEDED
Status: DISCONTINUED | OUTPATIENT
Start: 2025-03-23 | End: 2025-03-25

## 2025-03-23 RX ORDER — SIMETHICONE 80 MG
80 TABLET,CHEWABLE ORAL 3 TIMES DAILY PRN
Status: DISCONTINUED | OUTPATIENT
Start: 2025-03-23 | End: 2025-03-25

## 2025-03-23 RX ORDER — ONDANSETRON 2 MG/ML
4 INJECTION INTRAMUSCULAR; INTRAVENOUS EVERY 6 HOURS PRN
Status: DISCONTINUED | OUTPATIENT
Start: 2025-03-23 | End: 2025-03-24 | Stop reason: HOSPADM

## 2025-03-23 RX ORDER — LOPERAMIDE HYDROCHLORIDE 2 MG/1
2 CAPSULE ORAL 4 TIMES DAILY PRN
Status: DISCONTINUED | OUTPATIENT
Start: 2025-03-23 | End: 2025-03-25

## 2025-03-23 RX ORDER — CARBOPROST TROMETHAMINE 250 UG/ML
INJECTION, SOLUTION INTRAMUSCULAR
Status: COMPLETED
Start: 2025-03-23 | End: 2025-03-23

## 2025-03-23 RX ORDER — ACETAMINOPHEN 500 MG
1000 TABLET ORAL EVERY 6 HOURS PRN
Status: DISCONTINUED | OUTPATIENT
Start: 2025-03-23 | End: 2025-03-25

## 2025-03-23 RX ORDER — TERBUTALINE SULFATE 1 MG/ML
0.25 INJECTION SUBCUTANEOUS AS NEEDED
Status: DISCONTINUED | OUTPATIENT
Start: 2025-03-23 | End: 2025-03-24 | Stop reason: HOSPADM

## 2025-03-23 RX ORDER — IBUPROFEN 600 MG/1
600 TABLET, FILM COATED ORAL EVERY 6 HOURS
Status: DISCONTINUED | OUTPATIENT
Start: 2025-03-23 | End: 2025-03-25

## 2025-03-23 RX ORDER — ACETAMINOPHEN 500 MG
1000 TABLET ORAL EVERY 6 HOURS PRN
Status: DISCONTINUED | OUTPATIENT
Start: 2025-03-23 | End: 2025-03-23

## 2025-03-23 RX ORDER — ACETAMINOPHEN 500 MG
500 TABLET ORAL EVERY 6 HOURS PRN
Status: DISCONTINUED | OUTPATIENT
Start: 2025-03-23 | End: 2025-03-25

## 2025-03-23 NOTE — H&P
[facility]  part of [facility]    History & Physical    Mechelle Wise Patient Status:  Inpatient    1990 MRN [id number]   Location [facility] Attending Nikolay Gonzalez MD   Hosp Day # 0 PCP LORE PABLO MD     Date of Admission:  3/23/2025      HPI:   Mechelle Wise is a 35 year old  female , current EGA of 38w3d with an estimated date of delivery of: 4/3/2025, by Last Menstrual Period      Mechelle Wise is being admitted for induction of labor.    Her current obstetrical history is significant for gdma2, on metformin 500 daily.    Patient reports good fetal movement .     Fetal Movement: normal.     History   Obstetric History:   OB History    Para Term  AB Living   5 4 4     4   SAB IAB Ectopic Multiple Live Births           4      # Outcome Date GA Lbr Andrew/2nd Weight Sex Type Anes PTL Lv   5 Current            4 Term 10/11/15 40w0d  10 lb (4.536 kg) M NORMAL SPONT   TERRIE   3 Term 11    M NORMAL SPONT   TERRIE   2 Term 08    M NORMAL SPONT   TERRIE   1 Term 06    F NORMAL SPONT   TERRIE     Surgical History: History reviewed. No pertinent surgical history.  Past Medical History:   Past Medical History:    BMI 31.0-31.9,adult    GDM, class A2 (HCC)    Renal stones     Past Social History: History reviewed. No pertinent surgical history.  Family History: History reviewed. No pertinent family history.  Social History:   Social History     Tobacco Use    Smoking status: Never     Passive exposure: Never    Smokeless tobacco: Never   Substance Use Topics    Alcohol use: Never        Allergies/Medications:   Allergies:   Allergies[1]  Medications:  Prescriptions Prior to Admission[2]    Review of Systems:   As documented in HPI    no complaints    Physical Exam:   Temp:  [97.8 °F (36.6 °C)] 97.8 °F (36.6 °C)  Pulse:  [88] 88  Resp:  [17] 17  BP: (90)/(59) 90/59    Constitutional: alert, appears stated age, and  cooperative  Respiratory: clear to auscultation bilaterally  Cardiac: regular rate and rhythm, S1, S2 normal, no murmur, click, rub or gallop  Abdomen: FHT present  NonStressTest:  Fetal Surveillance:  140 BPM Baseline; Fetal heart variability: moderate  Fetal Heart Rate decelerations: none  Fetal Heart Rate accelerations: yes, 15x15 accels over >= 20 minutes  Sterile speculum exam:   Sterile vaginal exam: Dilation: 2  Effacement: 70  Station: -2  Position: Cephalic    Results:     Lab Results   Component Value Date    TREPONEMALAB Nonreactive 01/11/2025    ABO O 11/13/2024    RH Positive 11/13/2024    WBC 8.3 03/23/2025    HGB 11.9 (L) 03/23/2025    HCT 35.5 03/23/2025    .0 03/23/2025    CREATSERUM 0.73 08/27/2024    BUN 8 (L) 08/27/2024     08/27/2024    K 4.7 08/27/2024     08/27/2024    CO2 25.0 08/27/2024    GLU 86 08/27/2024    CA 9.6 08/27/2024    ALB 4.2 08/27/2024    ALKPHO 80 08/27/2024    BILT 0.9 08/27/2024    TP 7.0 08/27/2024    AST 41 (H) 08/27/2024    ALT 43 08/27/2024    TSH 1.028 08/27/2024       Lab Results   Component Value Date    SPECGRAVITY 1.015 03/31/2024    GLUUR Negative 03/31/2024    NITRITE Negative 03/07/2023       No results found.      Assessment/Plan:    Mechelle Wise is at an estimated gestational age of 38w3d with an estimated date of delivery of:  4/3/2025, by Last Menstrual Period        Admission problem(s):    Pregnancy (HCC)  pitocin      Reactive NST. Fetal status reassuring  Plan: pitocin      Risks, benefits, alternatives and possible complications have been discussed in detail with the patient.     All questions answered.    Nikolay Gonzalez MD   3/23/2025  10:33 AM       [1] No Known Allergies  [2]   Medications Prior to Admission   Medication Sig Dispense Refill Last Dose/Taking    metFORMIN 500 MG Oral Tab Take 1 tablet (500 mg total) by mouth at bedtime. Take 20 minutes before your bedtime snack 30 tablet 0 Past Week    Blood Glucose Monitoring  Suppl (ONETOUCH ULTRA 2) w/Device Does not apply Kit Fasting, 2 hours after breakfast, 2 hours after lunch, and 2 hours after dinner  Please substitute what is on formulary 1 kit 0 3/22/2025    Glucose Blood (ONETOUCH ULTRA) In Vitro Strip Use strip to test blood glucose 4 times a day as directed. Please substitute what is on formulary 200 strip 3 3/22/2025    OneTouch UltraSoft Lancets Does not apply Misc Finger stick route 4 (four) times daily.Please substitute what is on formulary 200 each 2 3/22/2025    Prenatal Vit-DSS-Fe Fum-FA (PRENATAL 19) Oral Tab Take 1 tablet by mouth daily. 90 tablet 1 Past Week    ASPIRIN LOW DOSE 81 MG Oral Tab EC Take 2 tablets (162 mg total) by mouth daily. (Patient not taking: Reported on 3/20/2025)       Ferrous Sulfate 325 (65 Fe) MG Oral Tab Take 1 tablet (325 mg total) by mouth every other day. (Patient not taking: Reported on 3/20/2025) 90 tablet 3

## 2025-03-23 NOTE — PROGRESS NOTES
Pt is a 35 year old female admitted to LDR2/LDR2-A.     Chief Complaint   Patient presents with    Scheduled Induction     IOL for gdmA2      Pt is  38w3d intra-uterine pregnancy.  History obtained, consents signed. Oriented to room, staff, and plan of care.

## 2025-03-23 NOTE — PLAN OF CARE
Problem: BIRTH - VAGINAL/ SECTION  Goal: Fetal and maternal status remain reassuring during the birth process  Description: INTERVENTIONS:- Monitor vital signs- Monitor fetal heart rate- Monitor uterine activity- Monitor labor progression (vaginal delivery)- DVT prophylaxis (C/S delivery)- Surgical antibiotic prophylaxis (C/S delivery)  Outcome: Progressing    Problem: PAIN - ADULT  Goal: Verbalizes/displays adequate comfort level or patient's stated pain goal  Description: INTERVENTIONS:- Encourage pt to monitor pain and request assistance- Assess pain using appropriate pain scale- Administer analgesics based on type and severity of pain and evaluate response- Implement non-pharmacological measures as appropriate and evaluate response- Consider cultural and social influences on pain and pain management- Manage/alleviate anxiety- Utilize distraction and/or relaxation techniques- Monitor for opioid side effects- Notify MD/LIP if interventions unsuccessful or patient reports new pain- Anticipate increased pain with activity and pre-medicate as appropriate  Outcome: Progressing     Problem: ANXIETY  Goal: Will report anxiety at manageable levels  Description: INTERVENTIONS:- Administer medication as ordered- Teach and rehearse alternative coping skills- Provide emotional support with 1:1 interaction with staff  Outcome: Progressing    Problem: Patient Centered Care  Goal: Patient preferences are identified and integrated in the patient's plan of care  Description: Interventions:- What would you like us to know as we care for you? Prefers not to have epidural  - Provide timely, complete, and accurate information to patient/family- Incorporate patient and family knowledge, values, beliefs, and cultural backgrounds into the planning and delivery of care- Encourage patient/family to participate in care and decision-making at the level they choose- Honor patient and family perspectives and choices  Outcome:  Progressing        Problem: Patient/Family Goals  Patient's Short Term Goal: Comfort and Pain Control     Interventions:   - Non Pharmacological pain intervention   - IV/IM and Epidural pain medication per Provider order and patient request  - Education  - Involve Patient in POC   - See additional Care Plan goals for specific interventions  Patient's Long Term Goal: Uncomplicated Delivery     Interventions:  - Assessment/Monitoring  - Induction/Augmentation per protocol and Provider order  - C/S per protocol and Provider order   - Education  - Intervention per protocol and Provider order with education   - Involve patient in POC  - See additional Care Plan goals for specific interventions

## 2025-03-24 LAB
BASOPHILS # BLD AUTO: 0.08 X10(3) UL (ref 0–0.2)
BASOPHILS # BLD AUTO: 0.09 X10(3) UL (ref 0–0.2)
BASOPHILS NFR BLD AUTO: 0.5 %
BASOPHILS NFR BLD AUTO: 1.1 %
DEPRECATED RDW RBC AUTO: 47.3 FL (ref 35.1–46.3)
DEPRECATED RDW RBC AUTO: 47.8 FL (ref 35.1–46.3)
EOSINOPHIL # BLD AUTO: 0.29 X10(3) UL (ref 0–0.7)
EOSINOPHIL # BLD AUTO: 1.23 X10(3) UL (ref 0–0.7)
EOSINOPHIL NFR BLD AUTO: 1.8 %
EOSINOPHIL NFR BLD AUTO: 14.7 %
ERYTHROCYTE [DISTWIDTH] IN BLOOD BY AUTOMATED COUNT: 14.2 % (ref 11–15)
ERYTHROCYTE [DISTWIDTH] IN BLOOD BY AUTOMATED COUNT: 14.5 % (ref 11–15)
GLUCOSE BLDC GLUCOMTR-MCNC: 86 MG/DL (ref 70–99)
HCT VFR BLD AUTO: 29.4 %
HCT VFR BLD AUTO: 35.5 %
HGB BLD-MCNC: 10 G/DL
HGB BLD-MCNC: 11.9 G/DL
IMM GRANULOCYTES # BLD AUTO: 0.07 X10(3) UL (ref 0–1)
IMM GRANULOCYTES # BLD AUTO: 0.08 X10(3) UL (ref 0–1)
IMM GRANULOCYTES NFR BLD: 0.5 %
IMM GRANULOCYTES NFR BLD: 0.8 %
LYMPHOCYTES # BLD AUTO: 1.74 X10(3) UL (ref 1–4)
LYMPHOCYTES # BLD AUTO: 1.74 X10(3) UL (ref 1–4)
LYMPHOCYTES NFR BLD AUTO: 10.9 %
LYMPHOCYTES NFR BLD AUTO: 20.9 %
MCH RBC QN AUTO: 30.7 PG (ref 26–34)
MCH RBC QN AUTO: 31 PG (ref 26–34)
MCHC RBC AUTO-ENTMCNC: 33.5 G/DL (ref 31–37)
MCHC RBC AUTO-ENTMCNC: 34 G/DL (ref 31–37)
MCV RBC AUTO: 91 FL
MCV RBC AUTO: 91.7 FL
MONOCYTES # BLD AUTO: 0.49 X10(3) UL (ref 0.1–1)
MONOCYTES # BLD AUTO: 1.05 X10(3) UL (ref 0.1–1)
MONOCYTES NFR BLD AUTO: 5.9 %
MONOCYTES NFR BLD AUTO: 6.6 %
NEUTROPHILS # BLD AUTO: 12.74 X10 (3) UL (ref 1.5–7.7)
NEUTROPHILS # BLD AUTO: 12.74 X10(3) UL (ref 1.5–7.7)
NEUTROPHILS # BLD AUTO: 4.72 X10 (3) UL (ref 1.5–7.7)
NEUTROPHILS # BLD AUTO: 4.72 X10(3) UL (ref 1.5–7.7)
NEUTROPHILS NFR BLD AUTO: 56.6 %
NEUTROPHILS NFR BLD AUTO: 79.7 %
PLATELET # BLD AUTO: 156 10(3)UL (ref 150–450)
PLATELET # BLD AUTO: 165 10(3)UL (ref 150–450)
RBC # BLD AUTO: 3.23 X10(6)UL
RBC # BLD AUTO: 3.87 X10(6)UL
WBC # BLD AUTO: 16 X10(3) UL (ref 4–11)
WBC # BLD AUTO: 8.3 X10(3) UL (ref 4–11)

## 2025-03-24 RX ORDER — HYDROCODONE BITARTRATE AND ACETAMINOPHEN 5; 325 MG/1; MG/1
1 TABLET ORAL EVERY 6 HOURS PRN
Status: DISCONTINUED | OUTPATIENT
Start: 2025-03-24 | End: 2025-03-25

## 2025-03-24 NOTE — PLAN OF CARE
Problem: PAIN - ADULT  Goal: Verbalizes/displays adequate comfort level or patient's stated pain goal  Description: INTERVENTIONS:- Encourage pt to monitor pain and request assistance- Assess pain using appropriate pain scale- Administer analgesics based on type and severity of pain and evaluate response- Implement non-pharmacological measures as appropriate and evaluate response- Consider cultural and social influences on pain and pain management- Manage/alleviate anxiety- Utilize distraction and/or relaxation techniques- Monitor for opioid side effects- Notify MD/LIP if interventions unsuccessful or patient reports new pain- Anticipate increased pain with activity and pre-medicate as appropriate  Outcome: Progressing     Problem: ANXIETY  Goal: Will report anxiety at manageable levels  Description: INTERVENTIONS:- Administer medication as ordered- Teach and rehearse alternative coping skills- Provide emotional support with 1:1 interaction with staff  Outcome: Progressing     Problem: Patient Centered Care  Goal: Patient preferences are identified and integrated in the patient's plan of care  Description: Interventions:- What would you like us to know as we care for you? - Provide timely, complete, and accurate information to patient/family- Incorporate patient and family knowledge, values, beliefs, and cultural backgrounds into the planning and delivery of care- Encourage patient/family to participate in care and decision-making at the level they choose- Honor patient and family perspectives and choices  Outcome: Progressing     Problem: Patient/Family Goals  Goal: Patient/Family Long Term Goal  Description: Patient's Long Term Goal: Interventions:- - See additional Care Plan goals for specific interventions  Outcome: Progressing  Goal: Patient/Family Short Term Goal  Description: Patient's Short Term Goal: Interventions: - - See additional Care Plan goals for specific interventions  Outcome: Progressing      Problem: POSTPARTUM  Goal: Long Term Goal:Experiences normal postpartum course  Description: INTERVENTIONS:- Assess and monitor vital signs and lab values.- Assess fundus and lochia.- Provide ice/sitz baths for perineum discomfort.- Monitor healing of incision/episiotomy/laceration, and assess for signs and symptoms of infection and hematoma.- Assess bladder function and monitor for bladder distention.- Provide/instruct/assist with pericare as needed.- Provide VTE prophylaxis as needed.- Monitor bowel function.- Encourage ambulation and provide assistance as needed.- Assess and monitor emotional status and provide social service/psych resources as needed.- Utilize standard precautions and use personal protective equipment as indicated. Ensure aseptic care of all intravenous lines and invasive tubes/drains.- Obtain immunization and exposure to communicable diseases history.  Outcome: Progressing  Goal: Optimize infant feeding at the breast  Description: INTERVENTIONS:- Initiate breast feeding within first hour after birth. - Monitor effectiveness of current breast feeding efforts.- Assess support systems available to mother/family.- Identify cultural beliefs/practices regarding lactation, letdown techniques, maternal food preferences.- Assess mother's knowledge and previous experience with breast feeding.- Provide information as needed about early infant feeding cues (e.g., rooting, lip smacking, sucking fingers/hand) versus late cue of crying.- Discuss/demonstrate breast feeding aids (e.g., infant sling, nursing footstool/pillows, and breast pumps).- Encourage mother/other family members to express feelings/concerns, and actively listen.- Educate father/SO about benefits of breast feeding and how to manage common lactation challenges.- Recommend avoidance of specific medications or substances incompatible with breast feeding.- Assess and monitor for signs of nipple pain/trauma.- Instruct and provide assistance  with proper latch.- Review techniques for milk expression (breast pumping) and storage of breast milk. Provide pumping equipment/supplies, instructions and assistance, as needed.- Encourage rooming-in and breast feeding on demand.- Encourage skin-to-skin contact.- Provide LC support as needed.- Assess for and manage engorgement.- Provide breast feeding education handouts and information on community breast feeding support.   Outcome: Progressing  Goal: Establishment of adequate milk supply with medication/procedure interruptions  Description: INTERVENTIONS:- Review techniques for milk expression (breast pumping). - Provide pumping equipment/supplies, instructions, and assistance until it is safe to breastfeed infant.  Outcome: Progressing  Goal: Experiences normal breast weaning course  Description: INTERVENTIONS:- Assess for and manage engorgement.- Instruct on breast care.- Provide comfort measures.  Outcome: Progressing  Goal: Appropriate maternal -  bonding  Description: INTERVENTIONS:- Assess caregiver- interactions.- Assess caregiver's emotional status and coping mechanisms.- Encourage caregiver to participate in  daily care.- Assess support systems available to mother/family.- Provide /case management support as needed.  Outcome: Progressing

## 2025-03-24 NOTE — PROGRESS NOTES
PROGRESS NOTE        Date:     3/24/2025    Patient: Mechelle Wise       :    1990  Age:     35 year old      Gender: female  MRN:  [id number]    Subjective :  The patient is 35 year old y/o  Postpartum day #1 from a vaginal delivery.  She is doing well.  Lochia normal.  Pain controlled.  Breastfeeding without difficulty. Patient decline circumcision.        Objective   Physical Exam:  BP (!) 89/48   Pulse 74   Temp 97.9 °F (36.6 °C) (Oral)   Resp 16   Ht 4' 8\" (1.422 m)   Wt 154 lb   LMP 2024 (Exact Date)   Breastfeeding Yes   BMI 34.53 kg/m²     Intake/Output Summary (Last 24 hours) at 3/24/2025 0845  Last data filed at 3/24/2025 0230  Gross per 24 hour   Intake 1200 ml   Output 1850 ml   Net -650 ml     Abdomen - soft, ND, NT  Fundus -firm, NT  Lower Extremities - NT    Result Data:  Lab Results   Component Value Date    WBC 16.0 (H) 2025    RBC 3.23 (L) 2025    HGB 10.0 (L) 2025    HCT 29.4 (L) 2025    .0 2025    GLU 86 2024    BUN 8 (L) 2024     2024    K 4.7 2024     2024    CA 9.6 2024    CO2 25.0 2024     Abnormal Labs Reviewed   CBC WITH DIFFERENTIAL WITH PLATELET - Abnormal; Notable for the following components:       Result Value    HGB 11.9 (*)     RDW-SD 47.8 (*)     Eosinophil Absolute 1.23 (*)     All other components within normal limits   CBC WITH DIFFERENTIAL WITH PLATELET - Abnormal; Notable for the following components:    WBC 16.0 (*)     RBC 3.23 (*)     HGB 10.0 (*)     HCT 29.4 (*)     RDW-SD 47.3 (*)     Neutrophil Absolute Prelim 12.74 (*)     Neutrophil Absolute 12.74 (*)     Monocyte Absolute 1.05 (*)     All other components within normal limits             Assessment and Plan:    Active Problems:    Pregnancy (HCC)    Term birth of  male (HCC)        PPD # 1  Cont PP care.  Ambulate.  Anticipate discharge home tomorrow if remains stable       Mila  Deion THOMAS-S      Patient seen and examined, Agree with assessment and plan of care documented by PA student.     Birgit Flores MD

## 2025-03-24 NOTE — PROGRESS NOTES
Patient up to bathroom with assist x 2.  Voided 500ml at this time. Patient transferred to mother/baby room 363 per wheelchair in stable condition with baby and personal belongings.  Accompanied by significant other and staff.  Report given to mother/baby RN.

## 2025-03-24 NOTE — TELEPHONE ENCOUNTER
Forms completed and faxed, Disability to [facility], [phone] and Family Medical Leave Act to [employer], [phone], confirmation received.  MyChart sent to patient.

## 2025-03-24 NOTE — PLAN OF CARE
Problem: BIRTH - VAGINAL/ SECTION  Goal: Fetal and maternal status remain reassuring during the birth process  Description: INTERVENTIONS:- Monitor vital signs- Monitor fetal heart rate- Monitor uterine activity- Monitor labor progression (vaginal delivery)- DVT prophylaxis (C/S delivery)- Surgical antibiotic prophylaxis (C/S delivery)  Outcome: Completed     Problem: PAIN - ADULT  Goal: Verbalizes/displays adequate comfort level or patient's stated pain goal  Description: INTERVENTIONS:- Encourage pt to monitor pain and request assistance- Assess pain using appropriate pain scale- Administer analgesics based on type and severity of pain and evaluate response- Implement non-pharmacological measures as appropriate and evaluate response- Consider cultural and social influences on pain and pain management- Manage/alleviate anxiety- Utilize distraction and/or relaxation techniques- Monitor for opioid side effects- Notify MD/LIP if interventions unsuccessful or patient reports new pain- Anticipate increased pain with activity and pre-medicate as appropriate  Outcome: Progressing     Problem: ANXIETY  Goal: Will report anxiety at manageable levels  Description: INTERVENTIONS:- Administer medication as ordered- Teach and rehearse alternative coping skills- Provide emotional support with 1:1 interaction with staff  Outcome: Progressing     Problem: POSTPARTUM  Goal: Long Term Goal:Experiences normal postpartum course  Description: INTERVENTIONS:- Assess and monitor vital signs and lab values.- Assess fundus and lochia.- Provide ice/sitz baths for perineum discomfort.- Monitor healing of incision/episiotomy/laceration, and assess for signs and symptoms of infection and hematoma.- Assess bladder function and monitor for bladder distention.- Provide/instruct/assist with pericare as needed.- Provide VTE prophylaxis as needed.- Monitor bowel function.- Encourage ambulation and provide assistance as needed.- Assess and  monitor emotional status and provide social service/psych resources as needed.- Utilize standard precautions and use personal protective equipment as indicated. Ensure aseptic care of all intravenous lines and invasive tubes/drains.- Obtain immunization and exposure to communicable diseases history.  Outcome: Progressing  Goal: Optimize infant feeding at the breast  Description: INTERVENTIONS:- Initiate breast feeding within first hour after birth. - Monitor effectiveness of current breast feeding efforts.- Assess support systems available to mother/family.- Identify cultural beliefs/practices regarding lactation, letdown techniques, maternal food preferences.- Assess mother's knowledge and previous experience with breast feeding.- Provide information as needed about early infant feeding cues (e.g., rooting, lip smacking, sucking fingers/hand) versus late cue of crying.- Discuss/demonstrate breast feeding aids (e.g., infant sling, nursing footstool/pillows, and breast pumps).- Encourage mother/other family members to express feelings/concerns, and actively listen.- Educate father/SO about benefits of breast feeding and how to manage common lactation challenges.- Recommend avoidance of specific medications or substances incompatible with breast feeding.- Assess and monitor for signs of nipple pain/trauma.- Instruct and provide assistance with proper latch.- Review techniques for milk expression (breast pumping) and storage of breast milk. Provide pumping equipment/supplies, instructions and assistance, as needed.- Encourage rooming-in and breast feeding on demand.- Encourage skin-to-skin contact.- Provide LC support as needed.- Assess for and manage engorgement.- Provide breast feeding education handouts and information on community breast feeding support.   Outcome: Progressing  Goal: Establishment of adequate milk supply with medication/procedure interruptions  Description: INTERVENTIONS:- Review techniques for  milk expression (breast pumping). - Provide pumping equipment/supplies, instructions, and assistance until it is safe to breastfeed infant.  Outcome: Progressing  Goal: Experiences normal breast weaning course  Description: INTERVENTIONS:- Assess for and manage engorgement.- Instruct on breast care.- Provide comfort measures.  Outcome: Progressing  Goal: Appropriate maternal -  bonding  Description: INTERVENTIONS:- Assess caregiver- interactions.- Assess caregiver's emotional status and coping mechanisms.- Encourage caregiver to participate in  daily care.- Assess support systems available to mother/family.- Provide /case management support as needed.  Outcome: Progressing

## 2025-03-25 VITALS
DIASTOLIC BLOOD PRESSURE: 72 MMHG | RESPIRATION RATE: 16 BRPM | BODY MASS INDEX: 34.64 KG/M2 | HEIGHT: 56 IN | SYSTOLIC BLOOD PRESSURE: 101 MMHG | HEART RATE: 65 BPM | WEIGHT: 154 LBS | TEMPERATURE: 98 F

## 2025-03-25 RX ORDER — IBUPROFEN 600 MG/1
600 TABLET, FILM COATED ORAL EVERY 6 HOURS
Qty: 12 TABLET | Refills: 0 | Status: SHIPPED | OUTPATIENT
Start: 2025-03-25

## 2025-03-25 RX ORDER — PSEUDOEPHEDRINE HCL 30 MG
100 TABLET ORAL 2 TIMES DAILY
Qty: 20 CAPSULE | Refills: 0 | Status: SHIPPED | OUTPATIENT
Start: 2025-03-25

## 2025-03-25 RX ORDER — FERROUS SULFATE 325(65) MG
325 TABLET, DELAYED RELEASE (ENTERIC COATED) ORAL
Qty: 42 TABLET | Refills: 0 | Status: SHIPPED | OUTPATIENT
Start: 2025-03-25

## 2025-03-25 NOTE — PLAN OF CARE
Problem: PAIN - ADULT  Goal: Verbalizes/displays adequate comfort level or patient's stated pain goal  Description: INTERVENTIONS:- Encourage pt to monitor pain and request assistance- Assess pain using appropriate pain scale- Administer analgesics based on type and severity of pain and evaluate response- Implement non-pharmacological measures as appropriate and evaluate response- Consider cultural and social influences on pain and pain management- Manage/alleviate anxiety- Utilize distraction and/or relaxation techniques- Monitor for opioid side effects- Notify MD/LIP if interventions unsuccessful or patient reports new pain- Anticipate increased pain with activity and pre-medicate as appropriate  INTERVENTIONS:- Encourage pt to monitor pain and request assistance- Assess pain using appropriate pain scale- Administer analgesics based on type and severity of pain and evaluate response- Implement non-pharmacological measures as appropriate and evaluate response- Consider cultural and social influences on pain and pain management- Manage/alleviate anxiety- Utilize distraction and/or relaxation techniques- Monitor for opioid side effects- Notify MD/LIP if interventions unsuccessful or patient reports new pain- Anticipate increased pain with activity and pre-medicate as appropriate  Outcome: Progressing     Problem: POSTPARTUM  Goal: Long Term Goal:Experiences normal postpartum course  Description: INTERVENTIONS:- Assess and monitor vital signs and lab values.- Assess fundus and lochia.- Provide ice/sitz baths for perineum discomfort.- Monitor healing of incision/episiotomy/laceration, and assess for signs and symptoms of infection and hematoma.- Assess bladder function and monitor for bladder distention.- Provide/instruct/assist with pericare as needed.- Provide VTE prophylaxis as needed.- Monitor bowel function.- Encourage ambulation and provide assistance as needed.- Assess and monitor emotional status and provide  social service/psych resources as needed.- Utilize standard precautions and use personal protective equipment as indicated. Ensure aseptic care of all intravenous lines and invasive tubes/drains.- Obtain immunization and exposure to communicable diseases history.  Outcome: Progressing  Goal: Appropriate maternal -  bonding  Description: INTERVENTIONS:- Assess caregiver- interactions.- Assess caregiver's emotional status and coping mechanisms.- Encourage caregiver to participate in  daily care.- Assess support systems available to mother/family.- Provide /case management support as needed.  Outcome: Progressing

## 2025-03-25 NOTE — PROGRESS NOTES
[facility]  part of [facility]    OB/GYNE Progress Note      Mechelle Wise Patient Status:  Inpatient    1990 MRN [id number]   Location [facility] 3SE Attending Nikolay Gonzalez MD   Hosp Day # 2 PCP LORE PABLO MD          Subjective     No c/o    Review of Systems:  Constitutional: feeling well        Objective   Vital signs in last 24 hours:  Temp:  [97.7 °F (36.5 °C)-98.9 °F (37.2 °C)] 97.7 °F (36.5 °C)  Pulse:  [65-70] 65  Resp:  [16-17] 16  BP: ()/(57-72) 101/72    Input/Output:  No intake or output data in the 24 hours ending 25 1438    Weight (Last 6):  Wt Readings from Last 6 Encounters:   25 154 lb (69.9 kg)   25 154 lb (69.9 kg)   25 160 lb (72.6 kg)   25 155 lb (70.3 kg)   02/10/25 154 lb (69.9 kg)   25 155 lb (70.3 kg)     Body mass index is 34.53 kg/m².     Exam:   Constitutional: comfortable  Uterus: fundus firm and fundus below umbilicus  Extremities: No edema  Negative Favio's sign.          Results:     Lab Results   Component Value Date    TREPONEMALAB Nonreactive 2025    ABO O 2025    RH Positive 2025    WBC 16.0 (H) 2025    HGB 10.0 (L) 2025    HCT 29.4 (L) 2025    .0 2025    CREATSERUM 0.73 2024    BUN 8 (L) 2024     2024    K 4.7 2024     2024    CO2 25.0 2024    GLU 86 2024    CA 9.6 2024    ALB 4.2 2024    ALKPHO 80 2024    BILT 0.9 2024    TP 7.0 2024    AST 41 (H) 2024    ALT 43 2024    TSH 1.028 2024       Lab Results   Component Value Date    SPECGRAVITY 1.015 2024    GLUUR Negative 2024    NITRITE Negative 2023       No results found.      Assessment/Plan     Pregnancy (HCC)  pp2      Term birth of  male ([facility])          home      Discussed with: patient     Plan discussed with patient who verbalizes understanding and  agreement.          Nikolay Gonzalez MD  3/25/2025  2:38 PM

## 2025-03-25 NOTE — PLAN OF CARE
Problem: PAIN - ADULT  Goal: Verbalizes/displays adequate comfort level or patient's stated pain goal  Description: INTERVENTIONS:- Encourage pt to monitor pain and request assistance- Assess pain using appropriate pain scale- Administer analgesics based on type and severity of pain and evaluate response- Implement non-pharmacological measures as appropriate and evaluate response- Consider cultural and social influences on pain and pain management- Manage/alleviate anxiety- Utilize distraction and/or relaxation techniques- Monitor for opioid side effects- Notify MD/LIP if interventions unsuccessful or patient reports new pain- Anticipate increased pain with activity and pre-medicate as appropriate  INTERVENTIONS:- Encourage pt to monitor pain and request assistance- Assess pain using appropriate pain scale- Administer analgesics based on type and severity of pain and evaluate response- Implement non-pharmacological measures as appropriate and evaluate response- Consider cultural and social influences on pain and pain management- Manage/alleviate anxiety- Utilize distraction and/or relaxation techniques- Monitor for opioid side effects- Notify MD/LIP if interventions unsuccessful or patient reports new pain- Anticipate increased pain with activity and pre-medicate as appropriate  Outcome: Progressing     Problem: ANXIETY  Goal: Will report anxiety at manageable levels  Description: INTERVENTIONS:- Administer medication as ordered- Teach and rehearse alternative coping skills- Provide emotional support with 1:1 interaction with staff  Outcome: Progressing     Problem: Patient Centered Care  Goal: Patient preferences are identified and integrated in the patient's plan of care  Description: Interventions:- What would you like us to know as we care for you? - Provide timely, complete, and accurate information to patient/family- Incorporate patient and family knowledge, values, beliefs, and cultural backgrounds into the  planning and delivery of care- Encourage patient/family to participate in care and decision-making at the level they choose- Honor patient and family perspectives and choices  Outcome: Progressing     Problem: Patient/Family Goals  Goal: Patient/Family Long Term Goal  Description: Patient's Long Term Goal: Interventions:- - See additional Care Plan goals for specific interventions  Outcome: Progressing  Goal: Patient/Family Short Term Goal  Description: Patient's Short Term Goal: Interventions: - - See additional Care Plan goals for specific interventions  Outcome: Progressing     Problem: POSTPARTUM  Goal: Long Term Goal:Experiences normal postpartum course  Description: INTERVENTIONS:- Assess and monitor vital signs and lab values.- Assess fundus and lochia.- Provide ice/sitz baths for perineum discomfort.- Monitor healing of incision/episiotomy/laceration, and assess for signs and symptoms of infection and hematoma.- Assess bladder function and monitor for bladder distention.- Provide/instruct/assist with pericare as needed.- Provide VTE prophylaxis as needed.- Monitor bowel function.- Encourage ambulation and provide assistance as needed.- Assess and monitor emotional status and provide social service/psych resources as needed.- Utilize standard precautions and use personal protective equipment as indicated. Ensure aseptic care of all intravenous lines and invasive tubes/drains.- Obtain immunization and exposure to communicable diseases history.  Outcome: Progressing  Goal: Optimize infant feeding at the breast  Description: INTERVENTIONS:- Initiate breast feeding within first hour after birth. - Monitor effectiveness of current breast feeding efforts.- Assess support systems available to mother/family.- Identify cultural beliefs/practices regarding lactation, letdown techniques, maternal food preferences.- Assess mother's knowledge and previous experience with breast feeding.- Provide information as needed  about early infant feeding cues (e.g., rooting, lip smacking, sucking fingers/hand) versus late cue of crying.- Discuss/demonstrate breast feeding aids (e.g., infant sling, nursing footstool/pillows, and breast pumps).- Encourage mother/other family members to express feelings/concerns, and actively listen.- Educate father/SO about benefits of breast feeding and how to manage common lactation challenges.- Recommend avoidance of specific medications or substances incompatible with breast feeding.- Assess and monitor for signs of nipple pain/trauma.- Instruct and provide assistance with proper latch.- Review techniques for milk expression (breast pumping) and storage of breast milk. Provide pumping equipment/supplies, instructions and assistance, as needed.- Encourage rooming-in and breast feeding on demand.- Encourage skin-to-skin contact.- Provide LC support as needed.- Assess for and manage engorgement.- Provide breast feeding education handouts and information on community breast feeding support.   Outcome: Progressing  Goal: Establishment of adequate milk supply with medication/procedure interruptions  Description: INTERVENTIONS:- Review techniques for milk expression (breast pumping). - Provide pumping equipment/supplies, instructions, and assistance until it is safe to breastfeed infant.  Outcome: Progressing  Goal: Experiences normal breast weaning course  Description: INTERVENTIONS:- Assess for and manage engorgement.- Instruct on breast care.- Provide comfort measures.  Outcome: Progressing  Goal: Appropriate maternal -  bonding  Description: INTERVENTIONS:- Assess caregiver- interactions.- Assess caregiver's emotional status and coping mechanisms.- Encourage caregiver to participate in  daily care.- Assess support systems available to mother/family.- Provide /case management support as needed.  Outcome: Progressing

## 2025-03-25 NOTE — DISCHARGE SUMMARY
[facility]  part of [facility]    Discharge Summary    Mechelle Wise Patient Status:  Inpatient    1990 MRN [id number]   Location [facility] 3SE Attending Kavon Orr MD   Hosp Day # 2 PCP LORE PABLO MD     Date of Admission: 3/23/2025    Admission Diagnoses: pregnant  Pregnancy (HCC)    Date of Discharge: 3/25/25    Discharge Diagnoses:S/P Vaginal Delivery    Episode Diagnoses:   NOB Problems (from 10/09/24 to present)       No problems associated with this episode.                  Hospital Course:     EDC: Estimated Date of Delivery: 4/3/25    Gestational Age: 38w3d    Date of Delivery: 3/23/2025  Time of Delivery: 10:05 PM    Antepartum complications: ama, gdma2    Delivered By: KAVON ORR    Delivery Method: Normal spontaneous vaginal delivery    Delivery Procedures:     Baby: male      Apgars:  1 minute:   9                 5 minutes: 9                          10 minutes:      Feeding Method:The patient is currently breastfeeding.     Intrapartum Complications: None    Lacerations      Perineal lacerations: None      Vaginal laceration?: No      Cervical laceration?: No    Clitoral laceration?: No             Episiotomy: None    Placenta: Spontaneous    Postpartum complications: None                  Discharge Plan:   Discharge Condition: Stable    Discharge medications:  Current Discharge Medication List        New Orders    Details   docusate sodium 100 MG Oral Cap Take 100 mg by mouth 2 (two) times daily.      ibuprofen 600 MG Oral Tab Take 1 tablet (600 mg total) by mouth every 6 (six) hours.      ferrous sulfate 325 (65 FE) MG Oral Tab EC Take 1 tablet (325 mg total) by mouth daily with breakfast.           Home Meds - Unchanged    Details   Blood Glucose Monitoring Suppl (ONETOUCH ULTRA 2) w/Device Does not apply Kit Fasting, 2 hours after breakfast, 2 hours after lunch, and 2 hours after dinner  Please substitute what is on formulary       OneTouch UltraSoft Lancets Does not apply Misc Finger stick route 4 (four) times daily.Please substitute what is on formulary      Prenatal Vit-DSS-Fe Fum-FA (PRENATAL 19) Oral Tab Take 1 tablet by mouth daily.                   Discharge Diet: As tolerated    Discharge Activity: Pelvic rest until cleared    Follow up:     Follow Up in the Office: 3wks     Follow-up Information       Nikolay Gonzalez MD Follow up in 3 week(s).    Specialty: OBSTETRICS & GYNECOLOGY  Contact information:  Ruthann DOHERTY [address] [id number] [phone]                                     Nikolay Gonzalez MD  3/25/2025

## 2025-03-25 NOTE — DISCHARGE INSTRUCTIONS
Follow up with OB doctor in 6 weeks, or as directed by physician.  Pelvic rest for 6 weeks.  Call your OB doctor if you experience:    Heavy bleeding  Foul smelling discharge  Fever of 100.4 or above  Increased swelling  Severe headache and/or vision changes  Calf pain or tenderness  Changes in mood or depression

## 2025-03-25 NOTE — PLAN OF CARE
Problem: PAIN - ADULT  Goal: Verbalizes/displays adequate comfort level or patient's stated pain goal  Description: INTERVENTIONS:- Encourage pt to monitor pain and request assistance- Assess pain using appropriate pain scale- Administer analgesics based on type and severity of pain and evaluate response- Implement non-pharmacological measures as appropriate and evaluate response- Consider cultural and social influences on pain and pain management- Manage/alleviate anxiety- Utilize distraction and/or relaxation techniques- Monitor for opioid side effects- Notify MD/LIP if interventions unsuccessful or patient reports new pain- Anticipate increased pain with activity and pre-medicate as appropriate  INTERVENTIONS:- Encourage pt to monitor pain and request assistance- Assess pain using appropriate pain scale- Administer analgesics based on type and severity of pain and evaluate response- Implement non-pharmacological measures as appropriate and evaluate response- Consider cultural and social influences on pain and pain management- Manage/alleviate anxiety- Utilize distraction and/or relaxation techniques- Monitor for opioid side effects- Notify MD/LIP if interventions unsuccessful or patient reports new pain- Anticipate increased pain with activity and pre-medicate as appropriate  3/25/2025 1245 by Charity Ngo RN  Outcome: Completed  3/25/2025 0833 by Charity Ngo, RN  Outcome: Progressing     Problem: ANXIETY  Goal: Will report anxiety at manageable levels  Description: INTERVENTIONS:- Administer medication as ordered- Teach and rehearse alternative coping skills- Provide emotional support with 1:1 interaction with staff  3/25/2025 1245 by Charity Ngo, RN  Outcome: Completed  3/25/2025 0833 by Charity Ngo, RN  Outcome: Progressing     Problem: Patient Centered Care  Goal: Patient preferences are identified and integrated in the patient's plan of care  Description: Interventions:- What would you like  us to know as we care for you? - Provide timely, complete, and accurate information to patient/family- Incorporate patient and family knowledge, values, beliefs, and cultural backgrounds into the planning and delivery of care- Encourage patient/family to participate in care and decision-making at the level they choose- Honor patient and family perspectives and choices  3/25/2025 1245 by Charity Ngo RN  Outcome: Completed  3/25/2025 0833 by Charity Ngo RN  Outcome: Progressing     Problem: Patient/Family Goals  Goal: Patient/Family Long Term Goal  Description: Patient's Long Term Goal: Interventions:- - See additional Care Plan goals for specific interventions  3/25/2025 1245 by Charity Ngo RN  Outcome: Completed  3/25/2025 0833 by Charity Ngo RN  Outcome: Progressing  Goal: Patient/Family Short Term Goal  Description: Patient's Short Term Goal: Interventions: - - See additional Care Plan goals for specific interventions  3/25/2025 1245 by Charity Ngo RN  Outcome: Completed  3/25/2025 0833 by Charity Ngo RN  Outcome: Progressing     Problem: POSTPARTUM  Goal: Long Term Goal:Experiences normal postpartum course  Description: INTERVENTIONS:- Assess and monitor vital signs and lab values.- Assess fundus and lochia.- Provide ice/sitz baths for perineum discomfort.- Monitor healing of incision/episiotomy/laceration, and assess for signs and symptoms of infection and hematoma.- Assess bladder function and monitor for bladder distention.- Provide/instruct/assist with pericare as needed.- Provide VTE prophylaxis as needed.- Monitor bowel function.- Encourage ambulation and provide assistance as needed.- Assess and monitor emotional status and provide social service/psych resources as needed.- Utilize standard precautions and use personal protective equipment as indicated. Ensure aseptic care of all intravenous lines and invasive tubes/drains.- Obtain immunization and exposure to communicable  diseases history.  3/25/2025 1245 by Charity Ngo, RN  Outcome: Completed  3/25/2025 0833 by Charity Ngo, RN  Outcome: Progressing  Goal: Optimize infant feeding at the breast  Description: INTERVENTIONS:- Initiate breast feeding within first hour after birth. - Monitor effectiveness of current breast feeding efforts.- Assess support systems available to mother/family.- Identify cultural beliefs/practices regarding lactation, letdown techniques, maternal food preferences.- Assess mother's knowledge and previous experience with breast feeding.- Provide information as needed about early infant feeding cues (e.g., rooting, lip smacking, sucking fingers/hand) versus late cue of crying.- Discuss/demonstrate breast feeding aids (e.g., infant sling, nursing footstool/pillows, and breast pumps).- Encourage mother/other family members to express feelings/concerns, and actively listen.- Educate father/SO about benefits of breast feeding and how to manage common lactation challenges.- Recommend avoidance of specific medications or substances incompatible with breast feeding.- Assess and monitor for signs of nipple pain/trauma.- Instruct and provide assistance with proper latch.- Review techniques for milk expression (breast pumping) and storage of breast milk. Provide pumping equipment/supplies, instructions and assistance, as needed.- Encourage rooming-in and breast feeding on demand.- Encourage skin-to-skin contact.- Provide LC support as needed.- Assess for and manage engorgement.- Provide breast feeding education handouts and information on community breast feeding support.   3/25/2025 1245 by Charity Ngo, RN  Outcome: Completed  3/25/2025 0833 by Charity Ngo, RN  Outcome: Progressing  Goal: Establishment of adequate milk supply with medication/procedure interruptions  Description: INTERVENTIONS:- Review techniques for milk expression (breast pumping). - Provide pumping equipment/supplies, instructions, and  assistance until it is safe to breastfeed infant.  3/25/2025 1245 by Charity Ngo RN  Outcome: Completed  3/25/2025 0833 by Charity Ngo RN  Outcome: Progressing  Goal: Experiences normal breast weaning course  Description: INTERVENTIONS:- Assess for and manage engorgement.- Instruct on breast care.- Provide comfort measures.  3/25/2025 1245 by Charity Ngo RN  Outcome: Completed  3/25/2025 0833 by Charity Ngo RN  Outcome: Progressing  Goal: Appropriate maternal -  bonding  Description: INTERVENTIONS:- Assess caregiver- interactions.- Assess caregiver's emotional status and coping mechanisms.- Encourage caregiver to participate in  daily care.- Assess support systems available to mother/family.- Provide /case management support as needed.  3/25/2025 1245 by Charity Ngo RN  Outcome: Completed  3/25/2025 0833 by Charity Ngo RN  Outcome: Progressing

## 2025-04-08 ENCOUNTER — TELEPHONE (OUTPATIENT)
Dept: OBGYN CLINIC | Facility: CLINIC | Age: 35
End: 2025-04-08

## 2025-04-08 NOTE — TELEPHONE ENCOUNTER
Patient called using  line to check the status of Family Medical Leave Act and disability forms. Advised both forms was faxed on 03/25/25. Patient states employer never received forms. Patient unable to verify fax numbers for forms so offered to upload forms to [employer]. Patient advised it would be her responsibility to submit forms to both parties.

## 2025-04-08 NOTE — TELEPHONE ENCOUNTER
Patient calling to follow up on leave paperwork (see 2/27 encounter). Please call.     Also gave patient the number to the Forms Dept to get an update.

## 2025-04-14 ENCOUNTER — TELEPHONE (OUTPATIENT)
Dept: OBGYN CLINIC | Facility: CLINIC | Age: 35
End: 2025-04-14

## 2025-04-14 ENCOUNTER — POSTPARTUM (OUTPATIENT)
Dept: OBGYN CLINIC | Facility: CLINIC | Age: 35
End: 2025-04-14

## 2025-04-14 VITALS
HEIGHT: 58 IN | WEIGHT: 145 LBS | HEART RATE: 87 BPM | DIASTOLIC BLOOD PRESSURE: 64 MMHG | BODY MASS INDEX: 30.44 KG/M2 | SYSTOLIC BLOOD PRESSURE: 98 MMHG

## 2025-04-14 DIAGNOSIS — N63.10 MASS OF RIGHT BREAST ON MAMMOGRAM: ICD-10-CM

## 2025-04-14 DIAGNOSIS — R22.31 AXILLARY MASS, RIGHT: ICD-10-CM

## 2025-04-14 DIAGNOSIS — R22.31 AXILLARY MASS, RIGHT: Primary | ICD-10-CM

## 2025-04-14 RX ORDER — DROSPIRENONE 4 MG/1
1 TABLET, FILM COATED ORAL DAILY
Qty: 84 TABLET | Refills: 4 | Status: SHIPPED | OUTPATIENT
Start: 2025-04-14 | End: 2026-04-14

## 2025-04-14 NOTE — PROGRESS NOTES
Mechelle Wise is a 35 year old female  Patient's last menstrual period was 2024 (exact date).   Chief Complaint   Patient presents with    Postpartum Care   Breast feeding.     OBSTETRICS HISTORY:     OB History    Para Term  AB Living   5 5 5   5   SAB IAB Ectopic Multiple Live Births      0 5      # Outcome Date GA Lbr Andrew/2nd Weight Sex Type Anes PTL Lv   5 Term 25 38w3d 02:45 / 00:05 8 lb 6.4 oz (3.81 kg) M NORMAL SPONT None N TERRIE   4 Term 10/11/15 40w0d  10 lb (4.536 kg) M NORMAL SPONT   TERRIE   3 Term 11    M NORMAL SPONT   TERRIE   2 Term 08    M NORMAL SPONT   TERRIE   1 Term 06    F NORMAL SPONT   TERRIE       GYNE HISTORY:         Period Cycle (Days): 28 (10/9/2024  1:35 PM)  Period Duration (Days): 3 (10/9/2024  1:35 PM)  Period Flow: moderate (10/9/2024  1:35 PM)  Use of Birth Control (if yes, specify type): None (10/9/2024  1:35 PM)  Hx Prior Abnormal Pap: No (10/9/2024  1:35 PM)        Latest Ref Rng & Units 2023     3:27 PM   RECENT PAP RESULTS   INTERPRETATION/RESULT: Negative for intraepithelial lesion or malignancy Negative for intraepithelial lesion or malignancy          MEDICAL HISTORY:     Past Medical History[1]    SURGICAL HISTORY:     Past Surgical History[2]    SOCIAL HISTORY:     Short Social Hx on File[3]     FAMILY HISTORY:     Family History[4]    MEDICATIONS:     Medications - Current[5]    ALLERGIES:     Allergies[6]      REVIEW OF SYSTEMS:     Constitutional:    denies fever / chills  Eyes:     denies blurred or double vision  Cardiovascular:  denies chest pain or palpitations  Respiratory:    denies shortness of breath  Gastrointestinal:  denies severe abdominal pain, frequent diarrhea or constipation, nausea / vomiting  Genitourinary:    denies dysuria, bothersome incontinence  Skin/Breast:   denies any breast pain, lumps, or discharge  Neurological:    denies frequent severe headaches  Psychiatric:   denies depression or anxiety,  thoughts of harming self or others  Heme/Lymph:    denies easy bruising or bleeding      PHYSICAL EXAM:   Blood pressure 98/64, pulse 87, height 4' 10\" (1.473 m), weight 145 lb (65.8 kg), last menstrual period 06/27/2024, currently breastfeeding.  Constitutional:  well developed, well nourished  Head/Face:  normocephalic  Neck/Thyroid: thyroid symmetric, no thyromegaly, no nodules, no adenopathy  Lymphatic: no abnormal supraclavicular or axillary adenopathy is noted  Respiratory:      chest wall symmetric and nontender on palpation, clear to asculation bilateral, no wheezing, rales, ronchi, and resonance normal upon percussion  Cardiovascular: chest normal in appearance, regular rate and rhythm, no murmurs, PMI palpated midclavicular line  Breast:   normal bilaterally without palpable masses, tenderness, asymmetry, nipple discharge, nipple retraction or skin changes bilaterally  'right axillae with 2 cm mobile mass, tender. Pt states has had for 8 mos  Abdomen:   soft, nontender, nondistended, no masses  Skin/Hair:  no unusual rashes or bruises  Extremities:  no edema, no cyanosis, non tender bilaterally  Psychiatric:   oriented to time, place, person and situation. Appropriate mood and affect    Pelvic Exam:  External Genitalia:  normal appearance, hair distribution, and no lesions  Urethral Meatus:   normal in size, location, without lesions   Bladder:    no fullness, masses or tenderness  Vagina:    normal appearance without lesions, no abnormal discharge  Cervix:    No lesions, normal friability   Uterus:    normal in size, 8 wk sized, normal contour, position, mobility, without  motion tenderness  Adnexa:   normal without masses or tenderness  Perineum:   Normal, intact  Anus: no hemorroids         ASSESSMENT & PLAN:     Mechelle was seen today for postpartum care.    Diagnoses and all orders for this visit:    Postpartum exam (HCC)  -     Drospirenone (SLYND) 4 MG Oral Tab; Take 1 tablet by mouth daily.  Specifics  of the delivery were discussed with the patient.  The patient states that she is  breast and bottlefeeding.  The patient was screened for postpartum depression and anxiety with a validated instrument.  She denies any current symptoms of depression.  Screening for tobacco use and substance abuse was undertaken.  Follow-up of the prenatal problem list was addressed.  Infant care and feeding method was discussed with the patient.  Guidance regarding return to her fertility or guidance regarding contraception was discussed with the patient.  The patient opts for   ocp .  Resumption of exercise and coitus in 3 weeks discussed with the patient.   Pap smear follow-up was discussed with the patient.   Celina. A prescription for oral contraceptive pills was given for 13 months use.  Backup contraception during the first 28 days of use was advised.  Proper use of the pill including attempts at taking the pill at the same time every day discussed with the patient.  Doubling up the day after missed pill day was advised.  If 2 days are missed then backup contraception for another 28 days was advised.  Risks of increased blood clot formation with use of the pill was discussed with the patient.  She denies history of blood clots, liver disease, clotting disorders, breast cancer, or depression.  Benefits of the pill including proposed decrease in ovarian and endometrial cancer were discussed with the patient.  Light menses during use of the pill or amenorrhea during use of the pill are both normal and discussed with the patient.  The patient does not have hypertension or uncontrolled diabetes.    Axillary mass, right  Suspect hydradenitis, but willl order imaging. After imaging, pt to call Dr. Penn for consult  Mass of right breast on mammogram  -     [facility] DIAGNOSTIC RIGHT (CPT=77065); Future  -     [facility] BREAST RIGHT COMPLETE (CPT=76641); Future  -     SURGERY - INTERNAL          FOLLOW-UP     Return in about 3 months (around  7/14/2025) for Annual exam.      Nikolay Gonzalez MD  4/14/2025       [1]   Past Medical History:   BMI 31.0-31.9,adult    GDM, class A2 (HCC)    Renal stones   [2] History reviewed. No pertinent surgical history.  [3]   Social History  Socioeconomic History    Marital status: Single   Tobacco Use    Smoking status: Never     Passive exposure: Never    Smokeless tobacco: Never   Vaping Use    Vaping status: Never Used   Substance and Sexual Activity    Alcohol use: Never    Drug use: Never     Social Drivers of Health     Food Insecurity: No Food Insecurity (3/23/2025)    NCSS - Food Insecurity     Worried About Running Out of Food in the Last Year: No     Ran Out of Food in the Last Year: No   Transportation Needs: No Transportation Needs (3/23/2025)    NCSS - Transportation     Lack of Transportation: No   Stress: No Stress Concern Present (3/23/2025)    Stress     Feeling of Stress : No   Housing Stability: Not At Risk (3/23/2025)    NCSS - Housing/Utilities     Has Housing: Yes     Worried About Losing Housing: No     Unable to Get Utilities: No   [4] History reviewed. No pertinent family history.  [5]   Current Outpatient Medications:     ferrous sulfate 325 (65 FE) MG Oral Tab EC, Take 1 tablet (325 mg total) by mouth daily with breakfast., Disp: 42 tablet, Rfl: 0    Prenatal Vit-DSS-Fe Fum-FA (PRENATAL 19) Oral Tab, Take 1 tablet by mouth daily., Disp: 90 tablet, Rfl: 1    Drospirenone (SLYND) 4 MG Oral Tab, Take 1 tablet by mouth daily., Disp: 84 tablet, Rfl: 4    docusate sodium 100 MG Oral Cap, Take 100 mg by mouth 2 (two) times daily. (Patient not taking: Reported on 4/14/2025), Disp: 20 capsule, Rfl: 0    ibuprofen 600 MG Oral Tab, Take 1 tablet (600 mg total) by mouth every 6 (six) hours. (Patient not taking: Reported on 4/14/2025), Disp: 12 tablet, Rfl: 0    Blood Glucose Monitoring Suppl (ONETOUCH ULTRA 2) w/Device Does not apply Kit, Fasting, 2 hours after breakfast, 2 hours after lunch, and 2 hours  after dinner  Please substitute what is on formulary (Patient not taking: Reported on 4/14/2025), Disp: 1 kit, Rfl: 0    OneTouch UltraSoft Lancets Does not apply Misc, Finger stick route 4 (four) times daily.Please substitute what is on formulary (Patient not taking: Reported on 4/14/2025), Disp: 200 each, Rfl: 2  [6] No Known Allergies

## 2025-04-14 NOTE — TELEPHONE ENCOUNTER
Patient needs order for mammogram:  Diagnostic bilateral mammogram w/u/s to follow if needed.    Pls advise

## 2025-05-07 ENCOUNTER — HOSPITAL ENCOUNTER (OUTPATIENT)
Dept: ULTRASOUND IMAGING | Facility: HOSPITAL | Age: 35
Discharge: HOME | End: 2025-05-07
Attending: OBSTETRICS & GYNECOLOGY
Payer: COMMERCIAL

## 2025-05-07 ENCOUNTER — HOSPITAL ENCOUNTER (OUTPATIENT)
Dept: MAMMOGRAPHY | Facility: HOSPITAL | Age: 35
Discharge: HOME | End: 2025-05-07
Attending: OBSTETRICS & GYNECOLOGY
Payer: COMMERCIAL

## 2025-05-07 DIAGNOSIS — N63.10 MASS OF RIGHT BREAST ON MAMMOGRAM: ICD-10-CM

## 2025-05-07 DIAGNOSIS — R22.31 AXILLARY MASS, RIGHT: ICD-10-CM

## 2025-05-07 DIAGNOSIS — R92.8 ABNORMAL MAMMOGRAM: Primary | ICD-10-CM

## 2025-05-07 PROCEDURE — 76642 ULTRASOUND BREAST LIMITED: CPT | Performed by: OBSTETRICS & GYNECOLOGY

## 2025-05-07 PROCEDURE — 77066 DX MAMMO INCL CAD BI: CPT | Performed by: OBSTETRICS & GYNECOLOGY

## 2025-05-07 PROCEDURE — 77062 BREAST TOMOSYNTHESIS BI: CPT | Performed by: OBSTETRICS & GYNECOLOGY

## 2025-05-19 ENCOUNTER — TELEPHONE (OUTPATIENT)
Dept: OBGYN CLINIC | Facility: CLINIC | Age: 35
End: 2025-05-19

## 2025-05-19 RX ORDER — FERROUS SULFATE 325(65) MG
325 TABLET, DELAYED RELEASE (ENTERIC COATED) ORAL
Qty: 42 TABLET | Refills: 0 | Status: SHIPPED | OUTPATIENT
Start: 2025-05-19

## 2025-05-19 NOTE — TELEPHONE ENCOUNTER
Patient called for revision of disability on 5/16/25.  Actual delivery date 3/23/25, 6wks vaginal delivery 5/04/25. Form revised and faxed to Olga Lidia, [phone] with confirmation received.  Mychart sent to patient.

## 2025-05-19 NOTE — TELEPHONE ENCOUNTER
Speaking to Agatha from Health department.     Agatha called pt to follow up. Pt reported eye weakness and fatigue. Agatha called our office to follow up. ZAINA iron 9.8    Pt name and  verified     Pt reports eye weakness and fatigue. Informed pt fatigue could be common due to environmental and emotional changes from a  but informed pt we can send in an iron supplement. Recommended pt to follow up with her PCP for further recommendations regarding her eye weakness. Pt verbalized understanding and agreed.

## 2025-05-28 ENCOUNTER — OFFICE VISIT (OUTPATIENT)
Dept: FAMILY MEDICINE CLINIC | Facility: CLINIC | Age: 35
End: 2025-05-28

## 2025-05-28 VITALS
BODY MASS INDEX: 30.57 KG/M2 | HEART RATE: 74 BPM | WEIGHT: 145.63 LBS | DIASTOLIC BLOOD PRESSURE: 62 MMHG | HEIGHT: 58 IN | SYSTOLIC BLOOD PRESSURE: 97 MMHG

## 2025-05-28 DIAGNOSIS — H53.8 BLURRED VISION: Primary | ICD-10-CM

## 2025-05-28 PROCEDURE — 99213 OFFICE O/P EST LOW 20 MIN: CPT | Performed by: FAMILY MEDICINE

## 2025-05-28 RX ORDER — AMOXICILLIN 500 MG/1
TABLET, FILM COATED ORAL
COMMUNITY
Start: 2025-05-20

## 2025-05-28 RX ORDER — IBUPROFEN 800 MG/1
800 TABLET, FILM COATED ORAL EVERY 8 HOURS PRN
COMMUNITY
Start: 2025-05-20

## 2025-05-28 NOTE — PROGRESS NOTES
5/28/2025  10:03 AM    Mechelle Wise is a 35 year old female.    Chief complaint(s):   Chief Complaint   Patient presents with    Eye Problem     C/O eyes being tired, vision problems      HPI:     Mechelle Wise primary complaint is regarding as above.     Patient is a 35-year-old female who presents complaining of blurry vision bilaterally for the past 2 months.  Also complains of pain associated with this which is dull and minor.  There is no itchiness, eye discharge or redness of the eye.  He has never used eyeglasses but has never been to the eye doctor in the past.      HISTORY:  Past Medical History[1]   Past Surgical History[2]   Family History[3]   Social History: Short Social Hx on File[4]     Immunizations:   Immunization History   Administered Date(s) Administered    FLUZONE 6 months and older PFS 0.5 ml (14247) 11/06/2024    TDAP 09/09/2023, 01/08/2025       Medications (Active prior to today's visit):  Current Medications[5]    Allergies:  Allergies[6]      ROS:   Review of Systems   Constitutional:  Negative for appetite change and fever.   Eyes:  Positive for pain and visual disturbance. Negative for itching.   Respiratory:  Negative for shortness of breath.    Cardiovascular:  Negative for chest pain.   Gastrointestinal:  Negative for abdominal pain, nausea and vomiting.   Musculoskeletal:  Negative for back pain.   Skin:  Negative for rash.   Neurological:  Negative for dizziness and headaches.       PHYSICAL EXAM:   VS: BP 97/62 (BP Location: Right arm, Patient Position: Sitting, Cuff Size: adult)   Pulse 74   Ht 4' 10\" (1.473 m)   Wt 145 lb 9.6 oz (66 kg)   LMP 06/27/2024 (Exact Date)   Breastfeeding Yes   BMI 30.43 kg/m²     Physical Exam  Vitals reviewed.   Constitutional:       General: She is not in acute distress.     Appearance: Normal appearance.   HENT:      Head: Normocephalic.   Eyes:      Extraocular Movements: Extraocular movements intact.      Conjunctiva/sclera:  Conjunctivae normal.   Cardiovascular:      Rate and Rhythm: Normal rate.   Pulmonary:      Effort: Pulmonary effort is normal.   Musculoskeletal:      Cervical back: Neck supple.   Skin:     Findings: No rash.   Psychiatric:         Mood and Affect: Mood normal.         LABORATORY RESULTS:     EKG / Spirometry : -     Radiology:      ASSESSMENT/PLAN:   Assessment   Encounter Diagnosis   Name Primary?    Blurred vision Yes       REFERRALS: OPHTHALMOLOGY - INTERNAL,       Procedures    OPHTHALMOLOGY - INTERNAL   RECOMMENDATIONS given include: Patient was reassured of  her medical condition and all questions and concerns were answered. Patient was informed to please, call our office with any new or further questions or concerns that may come up in the near future. Notify Dr Pablo or the [facility] if there is a deterioration or worsening of the medical condition. Also, inform the doctor with any new symptoms or medications' side effects.      FOLLOW-UP: Schedule a follow-up visit in  prn.            Orders This Visit:  No orders of the defined types were placed in this encounter.      Meds This Visit:  Requested Prescriptions      No prescriptions requested or ordered in this encounter       Imaging & Referrals:  OPHTHALMOLOGY - INTERNAL         LORE PABLO MD       [1]   Past Medical History:   BMI 31.0-31.9,adult    GDM, class A2 (HCC)    Renal stones   [2] No past surgical history on file.  [3] No family history on file.  [4]   Social History  Socioeconomic History    Marital status: Single   Tobacco Use    Smoking status: Never     Passive exposure: Never    Smokeless tobacco: Never   Vaping Use    Vaping status: Never Used   Substance and Sexual Activity    Alcohol use: Never    Drug use: Never     Social Drivers of Health     Food Insecurity: No Food Insecurity (3/23/2025)    NCSS - Food Insecurity     Worried About Running Out of Food in the Last Year: No     Ran Out of Food in the Last Year: No    Transportation Needs: No Transportation Needs (3/23/2025)    NCSS - Transportation     Lack of Transportation: No   Stress: No Stress Concern Present (3/23/2025)    Stress     Feeling of Stress : No   Housing Stability: Not At Risk (3/23/2025)    NCSS - Housing/Utilities     Has Housing: Yes     Worried About Losing Housing: No     Unable to Get Utilities: No   [5]   Current Outpatient Medications   Medication Sig Dispense Refill    ferrous sulfate 325 (65 FE) MG Oral Tab EC Take 1 tablet (325 mg total) by mouth daily with breakfast. 42 tablet 0    amoxicillin 500 MG Oral Tab TAKE 1 TABLET BY MOUTH EVERY 8 HOURS AFTER MEALS FOR 7 DAYS (Patient not taking: Reported on 5/28/2025)      ibuprofen 800 MG Oral Tab Take 1 tablet (800 mg total) by mouth every 8 (eight) hours as needed for Pain. (Patient not taking: Reported on 5/28/2025)     [6] No Known Allergies

## (undated) NOTE — LETTER
VACCINE ADMINISTRATION RECORD  PARENT / GUARDIAN APPROVAL  Date: 2025  Vaccine administered to: Mechelle Wise     : 1990    MRN: [id number]    A copy of the appropriate Centers for Disease Control and Prevention Vaccine Information statement has been provided. I have read or have had explained the information about the diseases and the vaccines listed below. There was an opportunity to ask questions and any questions were answered satisfactorily. I believe that I understand the benefits and risks of the vaccine cited and ask that the vaccine(s) listed below be given to me or to the person named above (for whom I am authorized to make this request).    VACCINES ADMINISTERED:  Tdap    I have read and hereby agree to be bound by the terms of this agreement as stated above. My signature is valid until revoked by me in writing.  This document is signed by self, relationship: Self on 2025.:                                                                                                                                         Parent / Guardian Signature                                                Date

## (undated) NOTE — LETTER
Date & Time: 11/6/2023, 6:51 PM  Patient: Zac Wise  Encounter Provider(s):    MOIRA Blum       To Whom It May Concern:    Zac Wise was seen and treated in our department on 11/6/2023. She  should be allowed to work with the following restrictions: Without use of right hand and allowed to wear splint for the next 72 hrs, or until otherwise directed by occupational health.     If you have any questions or concerns, please do not hesitate to call.        _____________________________  Physician/APC Signature

## (undated) NOTE — LETTER
8/27/2024              Mechelle Wise        [address]        [facility] [address]         To Whom it may concern:    This is to certify that Mechelle Wise had an appointment on 8/27/2024 at 3:15 PM with LORE PABLO MD.  Patient is currently pregnant. Expected date on confinement is 04/04/2025      Sincerely,       LORE PABLO MD  [address]  [facility] [id number] [phone]        Document electronically generated by:  LORE PABLO MD

## (undated) NOTE — LETTER
23      Patient: Meryl Wise  : 1990 Visit date: 2023    Dear Rosalba Hoskins,      I examined your patient in consultation today. She has suffered a painful bone contusion of the right middle finger. She has no fracture, and tendons are intact. We have placed her in a splint for protection and comfort, and will start therapy in 2 weeks. Thank you for your kind referral. If I may answer any questions, please feel free to contact me.      Sincerely,   Joshua Bird MD     CC:   No Recipients

## (undated) NOTE — LETTER
Date & Time: 3/31/2024, 3:37 PM  Patient: Mechelle Wise  Encounter Provider(s):    Judy Mahmood APRN         This certifies that I, Mechelle Wise, a patient at an [facility], am leaving the facility voluntarily and against the advice of my physician.    I acknowledge that I have been:    1. informed that my physician believes that I need to receive care here;  2. informed that if I leave, I could become sicker or even die; and  3. provided discharge instructions consistent with my current diagnosis.    I hereby release my physician, the facility, and its employees from all responsibility for any ill effects which may result from this action.        __________________________________  Patient or authorized caregiver signature    __________________________________  RN signature    If no patient or patient representative signature was obtained, sign below to acknowledge that the form was reviewed with the patient and that the patient refused to sign.    __________________________________  RN signature

## (undated) NOTE — LETTER
10/23/2024          To Whom It May Concern:    Mechelle Wise is currently under my medical care and may return to work at this time.    She may return to work on 10/24/2024. She is pregnant and was referred to Obstetrician.  Activity is restricted as follows: light duty and no lifting over 15 lbs.    If you require additional information please contact OB doctors' office.        Sincerely,       LORE PABLO MD          Document generated by:  LORE PABLO MD

## (undated) NOTE — Clinical Note
Overall blood sugars look good however fasting blood sugars are trending 95-98 so therefore I am starting some metformin with her bedtime snack.  She is scheduling her nonstress test and I recommend delivery 38 to 39 weeks.

## (undated) NOTE — LETTER
Date & Time: 3/31/2024, 3:37 PM  Patient: Mechelle Wise  Encounter Provider(s):    Judy Mahmood APRN       To Whom It May Concern:    Mechelle Wise was seen and treated in our department on 3/31/2024. She should not return to work until fever free, earliest 4/2/24 .    If you have any questions or concerns, please do not hesitate to call.        _____________________________  Physician/[facility] Signature